# Patient Record
Sex: MALE | Race: WHITE
[De-identification: names, ages, dates, MRNs, and addresses within clinical notes are randomized per-mention and may not be internally consistent; named-entity substitution may affect disease eponyms.]

---

## 2017-01-09 ENCOUNTER — APPOINTMENT (OUTPATIENT)
Dept: VASCULAR SURGERY | Facility: CLINIC | Age: 72
End: 2017-01-09

## 2017-01-09 VITALS
SYSTOLIC BLOOD PRESSURE: 136 MMHG | DIASTOLIC BLOOD PRESSURE: 82 MMHG | RESPIRATION RATE: 16 BRPM | TEMPERATURE: 98.2 F | HEART RATE: 75 BPM

## 2017-01-13 ENCOUNTER — EMERGENCY (EMERGENCY)
Facility: HOSPITAL | Age: 72
LOS: 0 days | Discharge: HOME | End: 2017-01-13
Attending: EMERGENCY MEDICINE
Payer: MEDICARE

## 2017-01-13 VITALS
TEMPERATURE: 98 F | HEIGHT: 69 IN | SYSTOLIC BLOOD PRESSURE: 133 MMHG | OXYGEN SATURATION: 97 % | HEART RATE: 79 BPM | RESPIRATION RATE: 16 BRPM | DIASTOLIC BLOOD PRESSURE: 78 MMHG | WEIGHT: 201.94 LBS

## 2017-01-13 DIAGNOSIS — Z85.118 PERSONAL HISTORY OF OTHER MALIGNANT NEOPLASM OF BRONCHUS AND LUNG: ICD-10-CM

## 2017-01-13 DIAGNOSIS — Z87.891 PERSONAL HISTORY OF NICOTINE DEPENDENCE: ICD-10-CM

## 2017-01-13 DIAGNOSIS — R10.9 UNSPECIFIED ABDOMINAL PAIN: ICD-10-CM

## 2017-01-13 DIAGNOSIS — M19.90 UNSPECIFIED OSTEOARTHRITIS, UNSPECIFIED SITE: ICD-10-CM

## 2017-01-13 DIAGNOSIS — R31.9 HEMATURIA, UNSPECIFIED: ICD-10-CM

## 2017-01-13 LAB
ALBUMIN SERPL ELPH-MCNC: 3.7 G/DL — SIGNIFICANT CHANGE UP (ref 3.3–5)
ALP SERPL-CCNC: 126 U/L — HIGH (ref 40–120)
ALT FLD-CCNC: 42 U/L — SIGNIFICANT CHANGE UP (ref 12–78)
AMYLASE P1 CFR SERPL: 60 U/L — SIGNIFICANT CHANGE UP (ref 25–115)
ANION GAP SERPL CALC-SCNC: 8 MMOL/L — SIGNIFICANT CHANGE UP (ref 5–17)
APAP SERPL-MCNC: < 2 UG/ML (ref 10–30)
APPEARANCE UR: CLEAR — SIGNIFICANT CHANGE UP
APTT BLD: 37.8 SEC — HIGH (ref 27.5–37.4)
AST SERPL-CCNC: 27 U/L — SIGNIFICANT CHANGE UP (ref 15–37)
BACTERIA # UR AUTO: ABNORMAL
BASOPHILS # BLD AUTO: 0.1 K/UL — SIGNIFICANT CHANGE UP (ref 0–0.2)
BASOPHILS NFR BLD AUTO: 0.9 % — SIGNIFICANT CHANGE UP (ref 0–2)
BILIRUB SERPL-MCNC: 0.7 MG/DL — SIGNIFICANT CHANGE UP (ref 0.2–1.2)
BILIRUB UR-MCNC: NEGATIVE — SIGNIFICANT CHANGE UP
BLD GP AB SCN SERPL QL: SIGNIFICANT CHANGE UP
BUN SERPL-MCNC: 22 MG/DL — SIGNIFICANT CHANGE UP (ref 7–23)
CALCIUM SERPL-MCNC: 9.4 MG/DL — SIGNIFICANT CHANGE UP (ref 8.5–10.1)
CHLORIDE SERPL-SCNC: 107 MMOL/L — SIGNIFICANT CHANGE UP (ref 96–108)
CO2 SERPL-SCNC: 27 MMOL/L — SIGNIFICANT CHANGE UP (ref 22–31)
COLOR SPEC: YELLOW — SIGNIFICANT CHANGE UP
CREAT SERPL-MCNC: 0.73 MG/DL — SIGNIFICANT CHANGE UP (ref 0.5–1.3)
DIFF PNL FLD: ABNORMAL
EOSINOPHIL # BLD AUTO: 0.4 K/UL — SIGNIFICANT CHANGE UP (ref 0–0.5)
EOSINOPHIL NFR BLD AUTO: 5.6 % — SIGNIFICANT CHANGE UP (ref 0–6)
EPI CELLS # UR: SIGNIFICANT CHANGE UP
GLUCOSE SERPL-MCNC: 102 MG/DL — HIGH (ref 70–99)
GLUCOSE UR QL: NEGATIVE MG/DL — SIGNIFICANT CHANGE UP
HCT VFR BLD CALC: 40 % — SIGNIFICANT CHANGE UP (ref 39–50)
HGB BLD-MCNC: 14.5 G/DL — SIGNIFICANT CHANGE UP (ref 13–17)
INR BLD: 1.08 RATIO — SIGNIFICANT CHANGE UP (ref 0.88–1.16)
KETONES UR-MCNC: ABNORMAL
LEUKOCYTE ESTERASE UR-ACNC: NEGATIVE — SIGNIFICANT CHANGE UP
LIDOCAIN IGE QN: 188 U/L — SIGNIFICANT CHANGE UP (ref 73–393)
LYMPHOCYTES # BLD AUTO: 1.9 K/UL — SIGNIFICANT CHANGE UP (ref 1–3.3)
LYMPHOCYTES # BLD AUTO: 24.6 % — SIGNIFICANT CHANGE UP (ref 13–44)
MCHC RBC-ENTMCNC: 29.4 PG — SIGNIFICANT CHANGE UP (ref 27–34)
MCHC RBC-ENTMCNC: 36.2 GM/DL — HIGH (ref 32–36)
MCV RBC AUTO: 81.2 FL — SIGNIFICANT CHANGE UP (ref 80–100)
MONOCYTES # BLD AUTO: 0.8 K/UL — SIGNIFICANT CHANGE UP (ref 0–0.9)
MONOCYTES NFR BLD AUTO: 10.9 % — SIGNIFICANT CHANGE UP (ref 2–14)
NEUTROPHILS # BLD AUTO: 4.5 K/UL — SIGNIFICANT CHANGE UP (ref 1.8–7.4)
NEUTROPHILS NFR BLD AUTO: 58 % — SIGNIFICANT CHANGE UP (ref 43–77)
NITRITE UR-MCNC: NEGATIVE — SIGNIFICANT CHANGE UP
OB PNL STL: NEGATIVE — SIGNIFICANT CHANGE UP
PH UR: 5 — SIGNIFICANT CHANGE UP (ref 4.8–8)
PLATELET # BLD AUTO: 236 K/UL — SIGNIFICANT CHANGE UP (ref 150–400)
POTASSIUM SERPL-MCNC: 4 MMOL/L — SIGNIFICANT CHANGE UP (ref 3.5–5.3)
POTASSIUM SERPL-SCNC: 4 MMOL/L — SIGNIFICANT CHANGE UP (ref 3.5–5.3)
PROT SERPL-MCNC: 7.1 GM/DL — SIGNIFICANT CHANGE UP (ref 6–8.3)
PROT UR-MCNC: 15 MG/DL
PROTHROM AB SERPL-ACNC: 12.1 SEC — SIGNIFICANT CHANGE UP (ref 10–13.1)
RBC # BLD: 4.93 M/UL — SIGNIFICANT CHANGE UP (ref 4.2–5.8)
RBC # FLD: 13.2 % — SIGNIFICANT CHANGE UP (ref 11–15)
RBC CASTS # UR COMP ASSIST: ABNORMAL /HPF (ref 0–4)
SALICYLATES SERPL-MCNC: <1.7 MG/DL — LOW (ref 2.8–20)
SODIUM SERPL-SCNC: 142 MMOL/L — SIGNIFICANT CHANGE UP (ref 135–145)
SP GR SPEC: 1 — LOW (ref 1.01–1.02)
UROBILINOGEN FLD QL: NEGATIVE MG/DL — SIGNIFICANT CHANGE UP
WBC # BLD: 7.7 K/UL — SIGNIFICANT CHANGE UP (ref 3.8–10.5)
WBC # FLD AUTO: 7.7 K/UL — SIGNIFICANT CHANGE UP (ref 3.8–10.5)
WBC UR QL: SIGNIFICANT CHANGE UP

## 2017-01-13 PROCEDURE — 99284 EMERGENCY DEPT VISIT MOD MDM: CPT

## 2017-01-13 PROCEDURE — 71010: CPT | Mod: 26

## 2017-01-13 PROCEDURE — 74177 CT ABD & PELVIS W/CONTRAST: CPT | Mod: 26

## 2017-01-13 RX ORDER — MORPHINE SULFATE 50 MG/1
2 CAPSULE, EXTENDED RELEASE ORAL ONCE
Qty: 0 | Refills: 0 | Status: DISCONTINUED | OUTPATIENT
Start: 2017-01-13 | End: 2017-01-13

## 2017-01-13 RX ORDER — PANTOPRAZOLE SODIUM 20 MG/1
40 TABLET, DELAYED RELEASE ORAL ONCE
Qty: 0 | Refills: 0 | Status: COMPLETED | OUTPATIENT
Start: 2017-01-13 | End: 2017-01-13

## 2017-01-13 RX ORDER — IOHEXOL 300 MG/ML
30 INJECTION, SOLUTION INTRAVENOUS ONCE
Qty: 0 | Refills: 0 | Status: COMPLETED | OUTPATIENT
Start: 2017-01-13 | End: 2017-01-13

## 2017-01-13 RX ORDER — ONDANSETRON 8 MG/1
8 TABLET, FILM COATED ORAL ONCE
Qty: 0 | Refills: 0 | Status: COMPLETED | OUTPATIENT
Start: 2017-01-13 | End: 2017-01-13

## 2017-01-13 RX ADMIN — IOHEXOL 30 MILLILITER(S): 300 INJECTION, SOLUTION INTRAVENOUS at 15:43

## 2017-01-13 RX ADMIN — ONDANSETRON 8 MILLIGRAM(S): 8 TABLET, FILM COATED ORAL at 15:31

## 2017-01-13 RX ADMIN — PANTOPRAZOLE SODIUM 40 MILLIGRAM(S): 20 TABLET, DELAYED RELEASE ORAL at 15:30

## 2017-01-13 NOTE — ED PROVIDER NOTE - OBJECTIVE STATEMENT
71 years old male walked in c/o left lower abd pain ( sore) 2/10 constant since yesterday. Pt sts he has been taking tramadol for joints pain for 3 months. Pt also c/o dark urine. Pt denies headache, dizziness, cough, sob, chest pain, nausea, vomiting, fever, chills, abd pain, dysuria or irregular bowel movements.

## 2017-01-13 NOTE — ED ADULT NURSE NOTE - OBJECTIVE STATEMENT
Patient states he has had dark urine since early yesterday morning, the pain in his left lower abdomen started last night. Patient states he has been on tramadol with acetaminophen, and he thinks these symptoms are a side effect of this medication.

## 2017-01-13 NOTE — ED PROVIDER NOTE - PROGRESS NOTE DETAILS
Pt is alert and oriented x 3 smiling the urine sample is clear light yellow pt denies headache, dizziness, sob, chest pain, nausea, vomiting, fever, chills, abd pain, dysuria or irregular bowel movements. Pt 's PMD Dr. Tia Nunez is notified about pt's labs and ct of abd/pelvis and sts he will see pt at this office tomorrow 1/14/2017 at his office. Pt is given and explained all test reports and advised to see his pmd Dr. Tia Nunez tomorrow at his office.

## 2017-01-13 NOTE — ED PROVIDER NOTE - CONSTITUTIONAL, MLM
normal... Well appearing, well nourished, awake, alert, oriented to person, place, time/situation and in no apparent distress. Speaking in clear full sentences smiling appears very comfortable

## 2017-01-29 ENCOUNTER — RESULT REVIEW (OUTPATIENT)
Age: 72
End: 2017-01-29

## 2017-02-05 PROBLEM — M19.90 UNSPECIFIED OSTEOARTHRITIS, UNSPECIFIED SITE: Chronic | Status: ACTIVE | Noted: 2017-01-13

## 2017-02-05 PROBLEM — C34.90 MALIGNANT NEOPLASM OF UNSPECIFIED PART OF UNSPECIFIED BRONCHUS OR LUNG: Chronic | Status: ACTIVE | Noted: 2017-01-13

## 2017-02-07 ENCOUNTER — OUTPATIENT (OUTPATIENT)
Dept: OUTPATIENT SERVICES | Facility: HOSPITAL | Age: 72
LOS: 1 days | Discharge: HOME | End: 2017-02-07

## 2017-02-07 VITALS
HEIGHT: 69 IN | RESPIRATION RATE: 18 BRPM | OXYGEN SATURATION: 98 % | DIASTOLIC BLOOD PRESSURE: 76 MMHG | SYSTOLIC BLOOD PRESSURE: 124 MMHG | HEART RATE: 79 BPM | TEMPERATURE: 97 F | WEIGHT: 205.91 LBS

## 2017-02-07 DIAGNOSIS — I73.9 PERIPHERAL VASCULAR DISEASE, UNSPECIFIED: Chronic | ICD-10-CM

## 2017-02-07 DIAGNOSIS — Z98.890 OTHER SPECIFIED POSTPROCEDURAL STATES: Chronic | ICD-10-CM

## 2017-02-07 DIAGNOSIS — Z01.818 ENCOUNTER FOR OTHER PREPROCEDURAL EXAMINATION: ICD-10-CM

## 2017-02-07 DIAGNOSIS — I73.9 PERIPHERAL VASCULAR DISEASE, UNSPECIFIED: ICD-10-CM

## 2017-02-07 DIAGNOSIS — D49.4 NEOPLASM OF UNSPECIFIED BEHAVIOR OF BLADDER: ICD-10-CM

## 2017-02-07 DIAGNOSIS — N32.9 BLADDER DISORDER, UNSPECIFIED: Chronic | ICD-10-CM

## 2017-02-07 DIAGNOSIS — Z90.2 ACQUIRED ABSENCE OF LUNG [PART OF]: Chronic | ICD-10-CM

## 2017-02-07 LAB
ANION GAP SERPL CALC-SCNC: 7 MMOL/L — SIGNIFICANT CHANGE UP (ref 5–17)
APPEARANCE UR: CLEAR — SIGNIFICANT CHANGE UP
APTT BLD: 36.3 SEC — SIGNIFICANT CHANGE UP (ref 27.5–37.4)
BASOPHILS # BLD AUTO: 0.1 K/UL — SIGNIFICANT CHANGE UP (ref 0–0.2)
BASOPHILS NFR BLD AUTO: 1 % — SIGNIFICANT CHANGE UP (ref 0–2)
BILIRUB UR-MCNC: NEGATIVE — SIGNIFICANT CHANGE UP
BUN SERPL-MCNC: 14 MG/DL — SIGNIFICANT CHANGE UP (ref 7–23)
CALCIUM SERPL-MCNC: 8.7 MG/DL — SIGNIFICANT CHANGE UP (ref 8.5–10.1)
CHLORIDE SERPL-SCNC: 107 MMOL/L — SIGNIFICANT CHANGE UP (ref 96–108)
CO2 SERPL-SCNC: 28 MMOL/L — SIGNIFICANT CHANGE UP (ref 22–31)
COLOR SPEC: YELLOW — SIGNIFICANT CHANGE UP
CREAT SERPL-MCNC: 0.68 MG/DL — SIGNIFICANT CHANGE UP (ref 0.5–1.3)
DIFF PNL FLD: NEGATIVE — SIGNIFICANT CHANGE UP
EOSINOPHIL # BLD AUTO: 0.6 K/UL — HIGH (ref 0–0.5)
EOSINOPHIL NFR BLD AUTO: 7.6 % — HIGH (ref 0–6)
GLUCOSE SERPL-MCNC: 85 MG/DL — SIGNIFICANT CHANGE UP (ref 70–99)
GLUCOSE UR QL: NEGATIVE MG/DL — SIGNIFICANT CHANGE UP
HCT VFR BLD CALC: 38.9 % — LOW (ref 39–50)
HGB BLD-MCNC: 13.3 G/DL — SIGNIFICANT CHANGE UP (ref 13–17)
INR BLD: 1.07 RATIO — SIGNIFICANT CHANGE UP (ref 0.88–1.16)
KETONES UR-MCNC: NEGATIVE — SIGNIFICANT CHANGE UP
LEUKOCYTE ESTERASE UR-ACNC: NEGATIVE — SIGNIFICANT CHANGE UP
LYMPHOCYTES # BLD AUTO: 1.6 K/UL — SIGNIFICANT CHANGE UP (ref 1–3.3)
LYMPHOCYTES # BLD AUTO: 22.3 % — SIGNIFICANT CHANGE UP (ref 13–44)
MCHC RBC-ENTMCNC: 27.7 PG — SIGNIFICANT CHANGE UP (ref 27–34)
MCHC RBC-ENTMCNC: 34.2 GM/DL — SIGNIFICANT CHANGE UP (ref 32–36)
MCV RBC AUTO: 81.1 FL — SIGNIFICANT CHANGE UP (ref 80–100)
MONOCYTES # BLD AUTO: 0.7 K/UL — SIGNIFICANT CHANGE UP (ref 0–0.9)
MONOCYTES NFR BLD AUTO: 10.1 % — SIGNIFICANT CHANGE UP (ref 2–14)
NEUTROPHILS # BLD AUTO: 4.3 K/UL — SIGNIFICANT CHANGE UP (ref 1.8–7.4)
NEUTROPHILS NFR BLD AUTO: 59 % — SIGNIFICANT CHANGE UP (ref 43–77)
NITRITE UR-MCNC: NEGATIVE — SIGNIFICANT CHANGE UP
PH UR: 6 — SIGNIFICANT CHANGE UP (ref 4.8–8)
PLATELET # BLD AUTO: 225 K/UL — SIGNIFICANT CHANGE UP (ref 150–400)
POTASSIUM SERPL-MCNC: 4.1 MMOL/L — SIGNIFICANT CHANGE UP (ref 3.5–5.3)
POTASSIUM SERPL-SCNC: 4.1 MMOL/L — SIGNIFICANT CHANGE UP (ref 3.5–5.3)
PROT UR-MCNC: NEGATIVE MG/DL — SIGNIFICANT CHANGE UP
PROTHROM AB SERPL-ACNC: 12 SEC — SIGNIFICANT CHANGE UP (ref 10–13.1)
RBC # BLD: 4.8 M/UL — SIGNIFICANT CHANGE UP (ref 4.2–5.8)
RBC # FLD: 12.4 % — SIGNIFICANT CHANGE UP (ref 11–15)
SODIUM SERPL-SCNC: 142 MMOL/L — SIGNIFICANT CHANGE UP (ref 135–145)
SP GR SPEC: 1.01 — SIGNIFICANT CHANGE UP (ref 1.01–1.02)
UROBILINOGEN FLD QL: 1 MG/DL
WBC # BLD: 7.3 K/UL — SIGNIFICANT CHANGE UP (ref 3.8–10.5)
WBC # FLD AUTO: 7.3 K/UL — SIGNIFICANT CHANGE UP (ref 3.8–10.5)

## 2017-02-07 NOTE — H&P PST ADULT - VISION (WITH CORRECTIVE LENSES IF THE PATIENT USUALLY WEARS THEM):
Partially impaired: cannot see medication labels or newsprint, but can see obstacles in path, and the surrounding layout; can count fingers at arm's length/Use glasses

## 2017-02-07 NOTE — H&P PST ADULT - NSANTHOSAYNRD_GEN_A_CORE
No. JOSE LUIS screening performed.  STOP BANG Legend: 0-2 = LOW Risk; 3-4 = INTERMEDIATE Risk; 5-8 = HIGH Risk

## 2017-02-07 NOTE — H&P PST ADULT - PMH
Arthritis    Lung cancer  NODULE REMOVED RIGHT SIDE 2016 Arthritis    HLD (hyperlipidemia)    Lung cancer  NODULE REMOVED RIGHT SIDE 2016  PVD (peripheral vascular disease)

## 2017-02-07 NOTE — H&P PST ADULT - PSH
Bladder problem  Had Surgery  PVD (peripheral vascular disease)  stents bilateral legs  S/P arthroscopy of right knee  x 2  S/P foot surgery, left    S/P inguinal hernia repair  Left  S/P lobectomy of lung  Right ( February 2016 )  S/P lumbar spine operation  2010

## 2017-02-07 NOTE — ASU PATIENT PROFILE, ADULT - PSH
Bladder problem  Had Surgery  S/P arthroscopy of right knee  x 2  S/P foot surgery, left    S/P inguinal hernia repair  Left  S/P lobectomy of lung  Right ( February 2016 )  S/P lumbar spine operation  2010

## 2017-02-07 NOTE — H&P PST ADULT - HISTORY OF PRESENT ILLNESS
71 year old male with neoplasm of unspecified behavior of bladder scheduled for transurethral resection of bladder

## 2017-02-08 LAB
CULTURE RESULTS: NO GROWTH — SIGNIFICANT CHANGE UP
SPECIMEN SOURCE: SIGNIFICANT CHANGE UP

## 2017-02-12 ENCOUNTER — RESULT REVIEW (OUTPATIENT)
Age: 72
End: 2017-02-12

## 2017-02-13 ENCOUNTER — OUTPATIENT (OUTPATIENT)
Dept: OUTPATIENT SERVICES | Facility: HOSPITAL | Age: 72
LOS: 1 days | Discharge: HOME | End: 2017-02-13
Payer: MEDICARE

## 2017-02-13 ENCOUNTER — TRANSCRIPTION ENCOUNTER (OUTPATIENT)
Age: 72
End: 2017-02-13

## 2017-02-13 VITALS
SYSTOLIC BLOOD PRESSURE: 116 MMHG | HEART RATE: 78 BPM | RESPIRATION RATE: 18 BRPM | OXYGEN SATURATION: 98 % | DIASTOLIC BLOOD PRESSURE: 80 MMHG

## 2017-02-13 VITALS
OXYGEN SATURATION: 100 % | WEIGHT: 205.91 LBS | HEIGHT: 69 IN | TEMPERATURE: 97 F | HEART RATE: 83 BPM | RESPIRATION RATE: 18 BRPM | DIASTOLIC BLOOD PRESSURE: 71 MMHG | SYSTOLIC BLOOD PRESSURE: 114 MMHG

## 2017-02-13 DIAGNOSIS — N32.9 BLADDER DISORDER, UNSPECIFIED: Chronic | ICD-10-CM

## 2017-02-13 DIAGNOSIS — Z98.890 OTHER SPECIFIED POSTPROCEDURAL STATES: Chronic | ICD-10-CM

## 2017-02-13 DIAGNOSIS — I73.9 PERIPHERAL VASCULAR DISEASE, UNSPECIFIED: Chronic | ICD-10-CM

## 2017-02-13 DIAGNOSIS — Z90.2 ACQUIRED ABSENCE OF LUNG [PART OF]: Chronic | ICD-10-CM

## 2017-02-13 PROCEDURE — 88112 CYTOPATH CELL ENHANCE TECH: CPT | Mod: 26

## 2017-02-13 PROCEDURE — 88307 TISSUE EXAM BY PATHOLOGIST: CPT | Mod: 26

## 2017-02-13 RX ORDER — FUROSEMIDE 40 MG
10 TABLET ORAL ONCE
Qty: 0 | Refills: 0 | Status: COMPLETED | OUTPATIENT
Start: 2017-02-13 | End: 2017-02-13

## 2017-02-13 RX ORDER — MORPHINE SULFATE 50 MG/1
2 CAPSULE, EXTENDED RELEASE ORAL
Qty: 0 | Refills: 0 | Status: DISCONTINUED | OUTPATIENT
Start: 2017-02-13 | End: 2017-02-13

## 2017-02-13 RX ORDER — SODIUM CHLORIDE 9 MG/ML
3 INJECTION INTRAMUSCULAR; INTRAVENOUS; SUBCUTANEOUS EVERY 8 HOURS
Qty: 0 | Refills: 0 | Status: DISCONTINUED | OUTPATIENT
Start: 2017-02-13 | End: 2017-02-13

## 2017-02-13 RX ORDER — SODIUM CHLORIDE 9 MG/ML
1000 INJECTION, SOLUTION INTRAVENOUS
Qty: 0 | Refills: 0 | Status: DISCONTINUED | OUTPATIENT
Start: 2017-02-13 | End: 2017-02-13

## 2017-02-13 RX ADMIN — Medication 10 MILLIGRAM(S): at 14:06

## 2017-02-13 RX ADMIN — SODIUM CHLORIDE 100 MILLILITER(S): 9 INJECTION, SOLUTION INTRAVENOUS at 14:28

## 2017-02-13 NOTE — BRIEF OPERATIVE NOTE - PRE-OP DX
Bladder tumor  02/13/2017    Active  [facility]  Ureteral tumor  02/13/2017  right distal  Active  [facility]

## 2017-02-13 NOTE — ASU PATIENT PROFILE, ADULT - PMH
Arthritis    HLD (hyperlipidemia)    Lung cancer  NODULE REMOVED RIGHT SIDE 2016  PVD (peripheral vascular disease)

## 2017-02-13 NOTE — BRIEF OPERATIVE NOTE - POST-OP DX
Bladder tumor  02/13/2017  multifocal  Active  [facility]  Ureter ca, right  02/13/2017    Active  [facility]

## 2017-02-13 NOTE — BRIEF OPERATIVE NOTE - PROCEDURE
Ureteroscopy, rigid  02/13/2017  right  Active  [facility]  TURB (transurethral resection of bladder)  02/13/2017  large tumor  Active  [facility]

## 2017-02-14 LAB — NON-GYNECOLOGICAL CYTOLOGY STUDY: SIGNIFICANT CHANGE UP

## 2017-02-15 LAB — SURGICAL PATHOLOGY FINAL REPORT - CH: SIGNIFICANT CHANGE UP

## 2017-02-16 DIAGNOSIS — I10 ESSENTIAL (PRIMARY) HYPERTENSION: ICD-10-CM

## 2017-02-16 DIAGNOSIS — M19.90 UNSPECIFIED OSTEOARTHRITIS, UNSPECIFIED SITE: ICD-10-CM

## 2017-02-16 DIAGNOSIS — C67.2 MALIGNANT NEOPLASM OF LATERAL WALL OF BLADDER: ICD-10-CM

## 2017-02-16 DIAGNOSIS — Z87.891 PERSONAL HISTORY OF NICOTINE DEPENDENCE: ICD-10-CM

## 2017-02-16 DIAGNOSIS — E78.5 HYPERLIPIDEMIA, UNSPECIFIED: ICD-10-CM

## 2017-02-16 DIAGNOSIS — Z86.73 PERSONAL HISTORY OF TRANSIENT ISCHEMIC ATTACK (TIA), AND CEREBRAL INFARCTION WITHOUT RESIDUAL DEFICITS: ICD-10-CM

## 2017-02-16 DIAGNOSIS — Z79.82 LONG TERM (CURRENT) USE OF ASPIRIN: ICD-10-CM

## 2017-02-16 DIAGNOSIS — Z79.02 LONG TERM (CURRENT) USE OF ANTITHROMBOTICS/ANTIPLATELETS: ICD-10-CM

## 2017-02-16 DIAGNOSIS — Z85.118 PERSONAL HISTORY OF OTHER MALIGNANT NEOPLASM OF BRONCHUS AND LUNG: ICD-10-CM

## 2017-03-05 ENCOUNTER — RESULT REVIEW (OUTPATIENT)
Age: 72
End: 2017-03-05

## 2017-03-06 ENCOUNTER — OUTPATIENT (OUTPATIENT)
Dept: OUTPATIENT SERVICES | Facility: HOSPITAL | Age: 72
LOS: 1 days | Discharge: HOME | End: 2017-03-06
Payer: MEDICARE

## 2017-03-06 ENCOUNTER — TRANSCRIPTION ENCOUNTER (OUTPATIENT)
Age: 72
End: 2017-03-06

## 2017-03-06 VITALS
OXYGEN SATURATION: 99 % | RESPIRATION RATE: 16 BRPM | SYSTOLIC BLOOD PRESSURE: 111 MMHG | DIASTOLIC BLOOD PRESSURE: 57 MMHG | WEIGHT: 201.06 LBS | HEART RATE: 74 BPM | HEIGHT: 69 IN | TEMPERATURE: 96 F

## 2017-03-06 VITALS
SYSTOLIC BLOOD PRESSURE: 120 MMHG | HEART RATE: 66 BPM | RESPIRATION RATE: 20 BRPM | OXYGEN SATURATION: 100 % | DIASTOLIC BLOOD PRESSURE: 66 MMHG

## 2017-03-06 DIAGNOSIS — Z98.890 OTHER SPECIFIED POSTPROCEDURAL STATES: Chronic | ICD-10-CM

## 2017-03-06 DIAGNOSIS — I73.9 PERIPHERAL VASCULAR DISEASE, UNSPECIFIED: Chronic | ICD-10-CM

## 2017-03-06 DIAGNOSIS — N32.9 BLADDER DISORDER, UNSPECIFIED: Chronic | ICD-10-CM

## 2017-03-06 DIAGNOSIS — Z90.2 ACQUIRED ABSENCE OF LUNG [PART OF]: Chronic | ICD-10-CM

## 2017-03-06 PROCEDURE — 88307 TISSUE EXAM BY PATHOLOGIST: CPT | Mod: 26

## 2017-03-06 RX ORDER — ASPIRIN/CALCIUM CARB/MAGNESIUM 324 MG
0 TABLET ORAL
Qty: 0 | Refills: 0 | COMMUNITY

## 2017-03-06 RX ORDER — SODIUM CHLORIDE 9 MG/ML
1000 INJECTION, SOLUTION INTRAVENOUS
Qty: 0 | Refills: 0 | Status: DISCONTINUED | OUTPATIENT
Start: 2017-03-06 | End: 2017-03-06

## 2017-03-06 RX ORDER — FENTANYL CITRATE 50 UG/ML
25 INJECTION INTRAVENOUS
Qty: 0 | Refills: 0 | Status: DISCONTINUED | OUTPATIENT
Start: 2017-03-06 | End: 2017-03-06

## 2017-03-06 RX ORDER — ONDANSETRON 8 MG/1
4 TABLET, FILM COATED ORAL ONCE
Qty: 0 | Refills: 0 | Status: DISCONTINUED | OUTPATIENT
Start: 2017-03-06 | End: 2017-03-06

## 2017-03-06 RX ORDER — ATORVASTATIN CALCIUM 80 MG/1
1 TABLET, FILM COATED ORAL
Qty: 0 | Refills: 0 | COMMUNITY

## 2017-03-06 RX ORDER — CLOPIDOGREL BISULFATE 75 MG/1
1 TABLET, FILM COATED ORAL
Qty: 0 | Refills: 0 | COMMUNITY

## 2017-03-06 RX ADMIN — SODIUM CHLORIDE 75 MILLILITER(S): 9 INJECTION, SOLUTION INTRAVENOUS at 14:00

## 2017-03-06 NOTE — H&P PST ADULT - HISTORY OF PRESENT ILLNESS
71 year old male with Malignant neoplasm  of bladder scheduled for transurethral resection of bladder. Had TURB on Feb 13, 2017. Readmitted for repeat TURB for staging

## 2017-03-08 LAB — SURGICAL PATHOLOGY FINAL REPORT - CH: SIGNIFICANT CHANGE UP

## 2017-03-10 DIAGNOSIS — Z79.82 LONG TERM (CURRENT) USE OF ASPIRIN: ICD-10-CM

## 2017-03-10 DIAGNOSIS — Z85.118 PERSONAL HISTORY OF OTHER MALIGNANT NEOPLASM OF BRONCHUS AND LUNG: ICD-10-CM

## 2017-03-10 DIAGNOSIS — N32.89 OTHER SPECIFIED DISORDERS OF BLADDER: ICD-10-CM

## 2017-03-10 DIAGNOSIS — E78.5 HYPERLIPIDEMIA, UNSPECIFIED: ICD-10-CM

## 2017-03-10 DIAGNOSIS — Z87.891 PERSONAL HISTORY OF NICOTINE DEPENDENCE: ICD-10-CM

## 2017-03-10 DIAGNOSIS — M19.90 UNSPECIFIED OSTEOARTHRITIS, UNSPECIFIED SITE: ICD-10-CM

## 2017-03-10 DIAGNOSIS — Z79.02 LONG TERM (CURRENT) USE OF ANTITHROMBOTICS/ANTIPLATELETS: ICD-10-CM

## 2017-03-10 DIAGNOSIS — N35.9 URETHRAL STRICTURE, UNSPECIFIED: ICD-10-CM

## 2017-04-03 ENCOUNTER — APPOINTMENT (OUTPATIENT)
Dept: VASCULAR SURGERY | Facility: CLINIC | Age: 72
End: 2017-04-03

## 2017-04-03 VITALS
HEART RATE: 76 BPM | BODY MASS INDEX: 29.33 KG/M2 | DIASTOLIC BLOOD PRESSURE: 90 MMHG | WEIGHT: 198 LBS | SYSTOLIC BLOOD PRESSURE: 129 MMHG | TEMPERATURE: 98 F | HEIGHT: 69 IN

## 2017-07-10 ENCOUNTER — APPOINTMENT (OUTPATIENT)
Dept: VASCULAR SURGERY | Facility: CLINIC | Age: 72
End: 2017-07-10

## 2017-07-17 ENCOUNTER — APPOINTMENT (OUTPATIENT)
Dept: VASCULAR SURGERY | Facility: CLINIC | Age: 72
End: 2017-07-17

## 2017-07-31 ENCOUNTER — APPOINTMENT (OUTPATIENT)
Dept: VASCULAR SURGERY | Facility: CLINIC | Age: 72
End: 2017-07-31

## 2017-08-14 ENCOUNTER — APPOINTMENT (OUTPATIENT)
Dept: VASCULAR SURGERY | Facility: CLINIC | Age: 72
End: 2017-08-14
Payer: MEDICARE

## 2017-08-14 VITALS
DIASTOLIC BLOOD PRESSURE: 79 MMHG | HEART RATE: 80 BPM | SYSTOLIC BLOOD PRESSURE: 145 MMHG | RESPIRATION RATE: 16 BRPM | HEIGHT: 69 IN | TEMPERATURE: 98.6 F

## 2017-08-14 PROCEDURE — 99213 OFFICE O/P EST LOW 20 MIN: CPT | Mod: 25

## 2017-08-14 PROCEDURE — 93926 LOWER EXTREMITY STUDY: CPT

## 2017-11-13 ENCOUNTER — APPOINTMENT (OUTPATIENT)
Dept: VASCULAR SURGERY | Facility: CLINIC | Age: 72
End: 2017-11-13
Payer: MEDICARE

## 2017-11-13 VITALS — HEART RATE: 80 BPM | DIASTOLIC BLOOD PRESSURE: 68 MMHG | SYSTOLIC BLOOD PRESSURE: 132 MMHG

## 2017-11-13 PROCEDURE — 99214 OFFICE O/P EST MOD 30 MIN: CPT

## 2017-11-13 PROCEDURE — 93925 LOWER EXTREMITY STUDY: CPT

## 2018-03-19 ENCOUNTER — APPOINTMENT (OUTPATIENT)
Dept: VASCULAR SURGERY | Facility: CLINIC | Age: 73
End: 2018-03-19
Payer: MEDICARE

## 2018-03-19 VITALS — SYSTOLIC BLOOD PRESSURE: 145 MMHG | HEART RATE: 72 BPM | DIASTOLIC BLOOD PRESSURE: 81 MMHG

## 2018-03-19 PROCEDURE — 99214 OFFICE O/P EST MOD 30 MIN: CPT

## 2018-03-19 PROCEDURE — 93925 LOWER EXTREMITY STUDY: CPT

## 2018-03-19 RX ORDER — TRAMADOL HYDROCHLORIDE AND ACETAMINOPHEN 37.5; 325 MG/1; MG/1
37.5-325 TABLET, FILM COATED ORAL
Refills: 0 | Status: ACTIVE | COMMUNITY

## 2018-06-18 ENCOUNTER — APPOINTMENT (OUTPATIENT)
Dept: VASCULAR SURGERY | Facility: CLINIC | Age: 73
End: 2018-06-18
Payer: MEDICARE

## 2018-06-18 PROCEDURE — 99214 OFFICE O/P EST MOD 30 MIN: CPT

## 2018-06-18 PROCEDURE — 93925 LOWER EXTREMITY STUDY: CPT

## 2018-09-17 ENCOUNTER — APPOINTMENT (OUTPATIENT)
Dept: VASCULAR SURGERY | Facility: CLINIC | Age: 73
End: 2018-09-17
Payer: MEDICARE

## 2018-09-17 PROCEDURE — 99213 OFFICE O/P EST LOW 20 MIN: CPT

## 2018-09-17 PROCEDURE — 93925 LOWER EXTREMITY STUDY: CPT

## 2018-09-18 PROBLEM — E78.5 HYPERLIPIDEMIA, UNSPECIFIED: Chronic | Status: ACTIVE | Noted: 2017-02-07

## 2018-09-18 PROBLEM — I73.9 PERIPHERAL VASCULAR DISEASE, UNSPECIFIED: Chronic | Status: ACTIVE | Noted: 2017-02-07

## 2018-12-14 NOTE — ED PROVIDER NOTE - CPE EDP SKIN NORM
Telephone Encounter by Urmila Ortiz NCMA at 09/26/17 12:00 PM     Author:  Urmila Ortiz NCMA Service:  (none) Author Type:  Certified Medical Assistant     Filed:  09/26/17 12:02 PM Encounter Date:  9/24/2017 Status:  Signed     :  Urmila Ortiz NCMA (Certified Medical Assistant)            Prescription has been electronically faxed to your pharmacy.    Refilled per medication protocol.[TR1.1T]        Revision History        User Key Date/Time User Provider Type Action    > TR1.1 09/26/17 12:02 PM Urmila Ortiz NCMA Certified Medical Assistant Sign    T - Template            
normal...

## 2019-01-07 ENCOUNTER — APPOINTMENT (OUTPATIENT)
Dept: VASCULAR SURGERY | Facility: CLINIC | Age: 74
End: 2019-01-07
Payer: MEDICARE

## 2019-01-07 VITALS
DIASTOLIC BLOOD PRESSURE: 88 MMHG | BODY MASS INDEX: 29.33 KG/M2 | WEIGHT: 198 LBS | HEIGHT: 69 IN | SYSTOLIC BLOOD PRESSURE: 135 MMHG | HEART RATE: 81 BPM

## 2019-01-07 PROCEDURE — 99213 OFFICE O/P EST LOW 20 MIN: CPT

## 2019-01-07 PROCEDURE — 93925 LOWER EXTREMITY STUDY: CPT

## 2019-01-08 NOTE — PHYSICAL EXAM
[No Rash or Lesion] : No rash or lesion [Alert] : alert [JVD] : no jugular venous distention  [Normal Breath Sounds] : Normal breath sounds [Normal Heart Sounds] : normal heart sounds [2+] : left 2+ [Ankle Swelling (On Exam)] : not present [Varicose Veins Of Lower Extremities] : not present [] : not present [Abdomen Masses] : No abdominal masses [Skin Ulcer] : no ulcer [Oriented to Place] : oriented to place [de-identified] : appears well [de-identified] : motor intact b/l lower extremities, muscle strength 5/5 with dorsi and plantar flexion\par  [de-identified] : b/l feet are warm with brisk cap refill [de-identified] : sensation intact b/l lower extremities

## 2019-01-08 NOTE — HISTORY OF PRESENT ILLNESS
[FreeTextEntry1] : 73 yo male with history of pad presents for follow up without any new complaints.  pt with chronic back pain and b/l lower extremity stiffness with chronic arthritis of the right knee.  pt reports pain of the right knee especially during damp cool weather.  pt denies any lower extremity claudications.  pt denies any lower extremity wounds or rest pain.  \par pt currently on asa plavix and statin and doing well

## 2019-01-08 NOTE — ASSESSMENT
[FreeTextEntry1] : 70 yo male with history of pad presents for follow up without any new complaints\par \par -us duplex shows persistant >75% stenosis of the left proximal native sfa with 20-50% in stent stenoisis bilaterally \par -pt to continue asa, plavix and lipitor \par -pt to follow up in 3 months with repeat duplex

## 2019-04-04 NOTE — H&P PST ADULT - PROBLEM SELECTOR PLAN 1
Electrodesiccation And Curettage Text: The wound bed was treated with electrodesiccation and curettage after the biopsy was performed. neoplasm of unspecified behavior of bladder scheduled for transurethral resection of bladder

## 2019-04-08 ENCOUNTER — APPOINTMENT (OUTPATIENT)
Dept: VASCULAR SURGERY | Facility: CLINIC | Age: 74
End: 2019-04-08
Payer: MEDICARE

## 2019-04-08 VITALS
HEART RATE: 75 BPM | BODY MASS INDEX: 29.33 KG/M2 | SYSTOLIC BLOOD PRESSURE: 105 MMHG | WEIGHT: 198 LBS | HEIGHT: 69 IN | DIASTOLIC BLOOD PRESSURE: 71 MMHG

## 2019-04-08 PROCEDURE — 93925 LOWER EXTREMITY STUDY: CPT

## 2019-04-08 PROCEDURE — 99213 OFFICE O/P EST LOW 20 MIN: CPT

## 2019-04-08 NOTE — HISTORY OF PRESENT ILLNESS
[FreeTextEntry1] : 71 yo male with history of pad presents for follow up without any new complaints.  pt with chronic back pain and b/l lower extremity stiffness with chronic arthritis of the right knee.  pt reports pain of the right knee especially during damp cool weather.  pt denies any lower extremity claudications.  pt denies any lower extremity wounds or rest pain.  \par pt currently on asa plavix and statin and doing well

## 2019-04-08 NOTE — ASSESSMENT
[FreeTextEntry1] : 72 yo male with history of pad presents for follow up without any new complaints\par \par -us duplex shows persistant >75% stenosis of the left proximal native sfa with 20-50% in stent stenoisis bilaterally \par -pt to continue asa, plavix and lipitor \par -pt to follow up in 3 months with repeat duplex

## 2019-04-08 NOTE — PHYSICAL EXAM
[JVD] : no jugular venous distention  [Normal Breath Sounds] : Normal breath sounds [Normal Heart Sounds] : normal heart sounds [2+] : left 2+ [Ankle Swelling (On Exam)] : not present [Varicose Veins Of Lower Extremities] : not present [] : not present [Abdomen Masses] : No abdominal masses [No Rash or Lesion] : No rash or lesion [Skin Ulcer] : no ulcer [Alert] : alert [Oriented to Place] : oriented to place [de-identified] : appears well [de-identified] : motor intact b/l lower extremities, muscle strength 5/5 with dorsi and plantar flexion\par  [de-identified] : b/l feet are warm with brisk cap refill [de-identified] : sensation intact b/l lower extremities

## 2019-06-26 ENCOUNTER — RECORD ABSTRACTING (OUTPATIENT)
Age: 74
End: 2019-06-26

## 2019-06-26 DIAGNOSIS — Z87.898 PERSONAL HISTORY OF OTHER SPECIFIED CONDITIONS: ICD-10-CM

## 2019-06-26 DIAGNOSIS — M19.90 UNSPECIFIED OSTEOARTHRITIS, UNSPECIFIED SITE: ICD-10-CM

## 2019-06-26 DIAGNOSIS — Z87.448 PERSONAL HISTORY OF OTHER DISEASES OF URINARY SYSTEM: ICD-10-CM

## 2019-06-26 DIAGNOSIS — N32.81 OVERACTIVE BLADDER: ICD-10-CM

## 2019-06-26 DIAGNOSIS — R33.8 OTHER RETENTION OF URINE: ICD-10-CM

## 2019-07-22 ENCOUNTER — APPOINTMENT (OUTPATIENT)
Dept: VASCULAR SURGERY | Facility: CLINIC | Age: 74
End: 2019-07-22
Payer: MEDICARE

## 2019-07-22 PROCEDURE — 93925 LOWER EXTREMITY STUDY: CPT

## 2019-07-22 PROCEDURE — 99213 OFFICE O/P EST LOW 20 MIN: CPT

## 2019-07-22 NOTE — PHYSICAL EXAM
[JVD] : no jugular venous distention  [Normal Breath Sounds] : Normal breath sounds [Normal Heart Sounds] : normal heart sounds [2+] : left 2+ [Ankle Swelling (On Exam)] : not present [Varicose Veins Of Lower Extremities] : not present [] : not present [Abdomen Masses] : No abdominal masses [No Rash or Lesion] : No rash or lesion [Alert] : alert [Skin Ulcer] : no ulcer [Oriented to Place] : oriented to place [de-identified] : appears well [de-identified] : motor intact b/l lower extremities, muscle strength 5/5 with dorsi and plantar flexion\par  [de-identified] : sensation intact b/l lower extremities [de-identified] : b/l feet are warm with brisk cap refill

## 2019-09-05 ENCOUNTER — APPOINTMENT (OUTPATIENT)
Dept: UROLOGY | Facility: CLINIC | Age: 74
End: 2019-09-05
Payer: MEDICARE

## 2019-09-05 DIAGNOSIS — Z85.118 PERSONAL HISTORY OF OTHER MALIGNANT NEOPLASM OF BRONCHUS AND LUNG: ICD-10-CM

## 2019-09-05 PROCEDURE — 52000 CYSTOURETHROSCOPY: CPT

## 2019-09-05 PROCEDURE — 99214 OFFICE O/P EST MOD 30 MIN: CPT | Mod: 25

## 2019-09-05 NOTE — HISTORY OF PRESENT ILLNESS
[FreeTextEntry1] : h/o ca bladder for follow up cysto , LUTS on Tamsulosin\par \par The patient is a 73 year old male who presents for a Follow-up for Bladder cancer. Date first diagnosed: (2001). TNM Staging - T: T1 ; N: N0 ; M: M0 .  Grade: G2. Residual tumor: unknown. Cell type: transitional cell carcinoma. Treatment components: transurethral resection and mitomycin. Diagnostic tests include cystoscopy and cytology. Note for "Bladder cancer": patient recalls having bladder tumor in 2001, treated with intravesical therapy. 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 Pathology : negative\par 04/05/2017 He was here for evaluation of effect of Ceftin\par 04/12/2017 Urine Culture results: No Growth\par \par patient has irritative symptoms , will r/o infection, urine culture if negative schedule for mitomycin c\par 04/24/2017: urine culture : no growth\par Mitomycin not available, therefore will start BCG\par \par 06/13/2017  1st BCG instillation was given.\par LOT: [id number]\par Exp: Feb 07, 2018\par \par 06/22/2017  2nd BCG live instillation WAS GIVEN.\par LOT: [id number]\par EXP: Feb 07/2018\par \par 06/29/2017 3RD BCG live instillation was given.\par Lot: [id number]\par EXP: FEB 07/2018\par \par 07/06/2017 4th BCG live instillation was given\par Lot: [id number]\par EXP: FEB 07/2018\par \par 07/13/2017 5th BCG live instillation was given.\par Lot:[id number]\par EXP: FEB 07/2018\par \par 07/27/2017 6th BCG live instillation was given.\par LOT:[id number]\par EXP:FEB 07/2018\par \par 09/06/2017 he is here for cystoscopy & cytology\par \par 11/01/2017 He was here for Pathology result.\par Bladder: Bladder wall with benign urothelial lining and minimal chronic inflammation.\par Urine Cytology: Negative.\par \par 11/01/2017 uroflo & bladder sono was done.\par \par 12/11/2017 Cysto was done.\par 01/11/2017 He is here for Pathology result.\par Bladder: Bladder wall with benign urothelial loning and focal small von brunn nest.\par Urine Cytology: Atypical findings.\par \par 03/14/2018 Cysto, cyt and B-sono was done.\par Pathology result:\par Bladder: Bladder mucosa with benign urothelial lining.\par Urine cyology: Acellular specimen\par \par 03/28/2018 Uroflo and B-sono was done.\par \par 05/23/2018 He is here for Cysto and Cyt.\par Pathology result:\par Bladder: Bladder mucosa with benign urothelial lining.\par Urine cystology: Negative.\par \par 06/20/2018 He was here for Urodynmic and B-sono.\par \par 08/08/2018 Uroflo and B-sono was done.\par \par 10/03/2018 cysto and cytology was done.\par \par 10/17/2018 He was here for Pathology result, Uroflow and B-sono.\par Pathology result: Bladder mucosa with beingn urothelial lining and focal von brunn nest.\par Urine Cytology: Atypical fingings.\par \par 01/23/2019  Uroflo and cystoscopy was done.\par \par 02/07/2019 He was here for Pathology result.\par Bladder: Bladder mucosa with mild chronic inflammation\par Urine cytology: Negative.\par \par 05/08/2019 Cystoscopy and Cytology was done.\par \par 06/12/2019  Pathology result\par Bladder: BLADDER MUCOSA WITH BENIGN UROTHELIAL LINING\par Urine Cytology: Atypical findings\par \par for follow up cystoscopy \par

## 2019-09-05 NOTE — PHYSICAL EXAM
[General Appearance - Well Developed] : well developed [General Appearance - Well Nourished] : well nourished [Normal Appearance] : normal appearance [Well Groomed] : well groomed [General Appearance - In No Acute Distress] : no acute distress [Abdomen Soft] : soft [Abdomen Tenderness] : non-tender [Costovertebral Angle Tenderness] : no ~M costovertebral angle tenderness [Urethral Meatus] : meatus normal [Urinary Bladder Findings] : the bladder was normal on palpation [Scrotum] : the scrotum was normal [Testes Mass (___cm)] : there were no testicular masses [Edema] : no peripheral edema [] : no respiratory distress [Respiration, Rhythm And Depth] : normal respiratory rhythm and effort [Exaggerated Use Of Accessory Muscles For Inspiration] : no accessory muscle use [Oriented To Time, Place, And Person] : oriented to person, place, and time [Affect] : the affect was normal [Not Anxious] : not anxious [Mood] : the mood was normal [No Palpable Adenopathy] : no palpable adenopathy [No Focal Deficits] : no focal deficits [FreeTextEntry1] : uses cane

## 2019-09-05 NOTE — REVIEW OF SYSTEMS
[Fever] : fever [Chills] : chills [Feeling Poorly] : feeling poorly [Feeling Tired] : feeling tired [Recent Weight Gain (___ Lbs)] : recent [unfilled] ~Ulb weight gain [Sore Throat] : sore throat [Constipation] : constipation [Heartburn] : heartburn [Diarrhea] : diarrhea [Told you have blood in urine on a urine test] : told blood was present in a urine test [Urine retention] : urine retention [Wake up at night to urinate  How many times?  ___] : wakes up to urinate [unfilled] times during the night [Interrupted urine stream] : interrupted urine stream [Slow urine stream] : slow urine stream [Bladder fullness after urinating] : bladder fullness after urinating [Joint Pain] : joint pain [Joint Swelling] : joint swelling [Limb Swelling] : limb swelling [Dizziness] : dizziness [Limb Weakness] : limb weakness [Difficulty Walking] : difficulty walking [Muscle Weakness] : muscle weakness [Feelings Of Weakness] : feelings of weakness [Negative] : Heme/Lymph

## 2019-09-05 NOTE — PROCEDURE
[Cystoscopy] : cystoscopy [LUTS] : lower urinary tract symptoms [Previous Bladder CA] : previous bladder cancer [Patient] : the patient [Last Aspirin Dose ____] : Reviewed last aspirin dose: [unfilled] [Allergies Reviewed] : Allergies reviewed [Pt took necessary Preparations and Antibiotics for Procedure] : pt took necessary preparations and antibiotics for procedure [None] : none [Ciprofloxacin] : Ciprofloxacin [Supine] : supine [Betadine] : with betadine [Intraurethral 2% Lidocaine Gel ___ (cc)] : [unfilled] cc of 2% Lidocaine Gel was administered intraurethrally  [Flexible Cystoscope] : A flexible cystoscope was used to visualize the urethra and bladder. [Normal] : was normal [Length ___ cm] : was estimated to be [unfilled] cm in length [Trilobar Hypertrophy] : had an enlargement of the lateral and median prostatic lobes [Obstructed] : was obstructed [] : had bloody efflux bilaterally [de-identified] : trabeculation [3] : a grade 3 [Multiple] : diverticula were present in the bladder wall [Instrumented] : an instrumented [Cytology] : cytology [No Complications] : There were no complications [Tolerated Well] : the patient tolerated the procedure well [Post procedure instructions and information given] : post procedure instructions and information given and reviewed with patient. [1] : 1 [Time Patient Entered Room ___] : patient entered room: [unfilled] [Time Out ___] : time out occurred at [unfilled] as per policy [Procedure Start Time ___] : procedure start time: [unfilled] [Procedure Stop Time ___] : procedure stop time: [unfilled] [Time Patient Exited Room ___] : patient exited room: [unfilled] [Location: ___] : and was located on the [unfilled] [Locations: ___] : and were located on the [unfilled] [Ciprofloxacin] : Ciprofloxacin [unfilled] [FreeTextEntry3] : LIZZ Scanoln [de-identified] : erythematous lesion [de-identified] : left lateral wall [de-identified] : grade 3 trabeculated bladder. No tumor in the diverticula.\par Erythematous lesion not biopsied

## 2019-09-12 LAB — URINE CYTOLOGY: NORMAL

## 2019-09-13 ENCOUNTER — APPOINTMENT (OUTPATIENT)
Dept: ORTHOPEDIC SURGERY | Facility: CLINIC | Age: 74
End: 2019-09-13
Payer: MEDICARE

## 2019-09-13 DIAGNOSIS — M25.562 PAIN IN RIGHT KNEE: ICD-10-CM

## 2019-09-13 DIAGNOSIS — M25.561 PAIN IN RIGHT KNEE: ICD-10-CM

## 2019-09-13 PROCEDURE — 99203 OFFICE O/P NEW LOW 30 MIN: CPT | Mod: 25

## 2019-09-13 PROCEDURE — 20610 DRAIN/INJ JOINT/BURSA W/O US: CPT | Mod: 50

## 2019-09-13 PROCEDURE — 73564 X-RAY EXAM KNEE 4 OR MORE: CPT | Mod: 50

## 2019-09-13 NOTE — ADDENDUM
[FreeTextEntry1] : This note was written by Christine Renner on 09/13/2019 acting as scribe for Dr. Tu Rios M.D.\par \par I, Dr. Tu Rios M.D., have read and attest that all the information, medical decision making and discharge instructions within are true and accurate.\par

## 2019-09-13 NOTE — DISCUSSION/SUMMARY
[de-identified] : Discussed at length the nature of the patients condition. Their bilateral knee symptoms appear secondary to degenerative arthritis, especially at the patellofemoral joint.  We reviewed operative and nonoperative treatment. While I believe he might eventually benefit from bilateral TKR, he is not a surgical candidate due to his multiple comorbidities as detailed above. Therefore, we will continue nonoperatively. We will seek authorization for bilateral knee Euflexxa injections. Once we receive authorization, we will proceed accordingly. In the interim, patient was given bilateral knee cortisone injections today as detailed above for symptomatic relief. I also suggested PT and Tylenol Arthritis Strength.  He cannot take anti-inflammatory medications due to being on Plavix. They can continue activities as tolerated.

## 2019-09-13 NOTE — HISTORY OF PRESENT ILLNESS
[de-identified] : 74 year old male presents for initial evaluation of bilateral knee pain for several years, worse over the last 6 months. Patient denies any specific injury. His pain is mostly achy with occasional stabbing. His pain is mostly diffuse with mild swelling, buckling, clicking, and locking. He walks 1-2 blocks with a cane and takes the stairs one at a time using the handrail and tries to avoid them if possible. He is on Asprin and Plavix for anticoagulation for stents in his bilateral LE. He had a right knee surgical arthroscopy followed by PT with no help. He has tried viscosupplementation which provided some relief, but has not received steroid injections. Today, he would like to discuss his treatment options with Dr. Rios.

## 2019-09-13 NOTE — PHYSICAL EXAM
[de-identified] : General appearance: well nourished and hydrated, pleasant, alert and oriented x 3, cooperative.\par HEENT: Normocephalic, EOM intact, Nasal septum midline, Oral cavity clear, External auditory canal clear.\par Cardiovascular: bilateral lower leg edema, no varicosities, pedal pulses are trace palpable.\par Lymphatics Lymph nodes: none palpated, Lymphedema: not present.\par Neurologic: sensation is normal, no muscle weakness in upper or lower extremities, patella tendon reflexes intact .\par Dermatologic no apparent skin lesions, moist, warm, no rash.\par Spine: cervical spine appears normal and moves freely, thoracic spine appears normal and moves freely, limited mobility at lumbosacral spine with tenderness\par Gait: nonantalgic.\par \par Left knee\par Inspection: no effusion or erythema.\par Wounds: none.\par Alignment: normal.\par Palpation: medial tenderness on palpation.\par ROM active (in degrees): 10 degree flexion contracture, 10-90 with crepitus and discomfort\par Ligamentous laxity: all ligaments appear stable,, negative ant. drawer test, negative post. drawer test, stable to varus stress test, stable to valgus stress test. negative Lachman's test, negative pivot shift test\par Meniscal Test: negative McMurrays, negative Janes.\par Patellofemoral Alignment Test: Q angle-, normal.\par Muscle Test: good quad strength.\par Leg examination: calf is soft and non-tender.\par \par Right knee\par Inspection: no effusion or erythema.\par Wounds: none.\par Alignment: normal.\par Palpation: medial and lateral tenderness on palpation.\par ROM active (in degrees): \par Ligamentous laxity: all ligaments appear stable,, negative ant. drawer test, negative post. drawer test, stable to varus stress test, stable to valgus stress test. negative Lachman's test, negative pivot shift test\par Meniscal Test: negative McMurrays, negative Janes.\par Patellofemoral Alignment Test: Q angle-, normal.\par Muscle Test: good quad strength.\par Leg examination: calf is soft and non-tender.\par \par Left hip\par Inspection: No swelling or ecchymosis.\par Wounds: none.\par Palpation: non-tender.\par Stability: no instability.\par Strength: 5/5 all motor groups.\par ROM: no pain with FROM.\par Leg length: equal.\par \par Right hip\par Inspection: No swelling or ecchymosis.\par Wounds: none.\par Palpation: non-tender.\par Stability: no instability.\par Strength: 5/5 all motor groups.\par ROM: no pain with FROM.\par Leg length: equal.\par \par Left ankle\par Inspection: no erythema noted, no swelling noted.\par Palpation: no pain on palpation .\par ROM: FROM without crepitus.\par Muscle strength: 5/5.\par Stability: no instability noted.\par \par Right ankle\par Inspection:  no erythema noted, no swelling noted.\par ROM: FROM without crepitus.\par Palpation: no pain on palpation .\par Muscle strength: 5/5.\par Stability: no instability noted.\par \par Left foot\par Inspection: swollen with healed dorsomedial incision at1st MTP joint, swelling and ecchymosis, slight hammer deformity of 2nd toe with healed surgical incision. \par ROM: full range of motion of all joints without pain or crepitus.\par Palpation: no tenderness.\par Stability: no instability noted.\par \par Right foot\par Inspection: hallux valgus, otherwise color, texture and turgor are normal.\par ROM: full range of motion of all joints without pain or crepitus.\par Palpation: no tenderness.\par Stability: no instability noted. [de-identified] : Left knee xrays, standing AP/Lateral, Merchant, and 45 degree PA standing view, taken at the office today shows diffuse tricompartmental degenerative arthritis, decreased medial joint height, patellofemoral joint space narrowing, marginal osteophytes, bone on bone, sclerosis, degenerative cyst in trochlea consistent with severe patellofemoral arthritis, calcification posterior vessels with a vascular stent\par \par Right knee xrays, standing AP/Lateral, Merchant, and 45 degree PA standing view, taken at the office today shows  diffuse tricompartmental degenerative arthritis, decreased medial joint height, patellofemoral joint space narrowing, marginal osteophytes, bone on bone, sclerosis, degenerative cyst in trochlea consistent with severe patellofemoral arthritis, calcification posterior vessels with a vascular stent

## 2019-09-13 NOTE — PROCEDURE
[de-identified] : BILATERAL KNEE CORTISONE INJECTION\par Discussed at length with the patient the planned steroid and lidocaine injection. The risks, benefits, convalescence and alternatives were reviewed. The possible side effects discussed included but were not limited to: pain, swelling, heat and redness. There symptoms are generally mild but if they are extensive then contact the office. Giving pain relievers by mouth such as NSAID’s or Tylenol can generally treat the reactions to  steroid and lidocaine. Rare cases of infection have been noted. Rash, hives and itching may occur post injection. If you have muscle pain or cramps, flushing and or swelling of the face, rapid heart beat, nausea, dizziness, fever, chills, headache, difficulty breathing, swelling in the arms or legs, or have a prickly feeling of your skin, contact a health care provider immediately.\par  \par Following this discussion, the knee was prepped with betadine and under sterile conditions 9 cc of 1% lidocaine and 1 cc (40 mg) of Depo-Medrol were injected with a 21 gauge needle. The needle was introduced into the joint, aspiration was performed to ensure intra-articular placement and the medication was injected. Upon withdrawal of the needle the site was cleaned with alcohol and a bandaid applied. The patient tolerated the injection well and there were no adverse effects. Post injection instructions included no strenuous activity for 24 hours, cryotherapy and if there are any adverse effects to contact the office.

## 2019-09-18 ENCOUNTER — APPOINTMENT (OUTPATIENT)
Dept: ORTHOPEDIC SURGERY | Facility: CLINIC | Age: 74
End: 2019-09-18
Payer: MEDICARE

## 2019-09-18 PROCEDURE — 20610 DRAIN/INJ JOINT/BURSA W/O US: CPT | Mod: 50

## 2019-09-18 NOTE — PROCEDURE

## 2019-09-25 ENCOUNTER — APPOINTMENT (OUTPATIENT)
Dept: ORTHOPEDIC SURGERY | Facility: CLINIC | Age: 74
End: 2019-09-25
Payer: MEDICARE

## 2019-09-25 ENCOUNTER — APPOINTMENT (OUTPATIENT)
Dept: UROLOGY | Facility: CLINIC | Age: 74
End: 2019-09-25
Payer: MEDICARE

## 2019-09-25 PROCEDURE — 99213 OFFICE O/P EST LOW 20 MIN: CPT

## 2019-09-25 PROCEDURE — 20610 DRAIN/INJ JOINT/BURSA W/O US: CPT | Mod: 50

## 2019-09-25 NOTE — PHYSICAL EXAM
[General Appearance - Well Nourished] : well nourished [General Appearance - Well Developed] : well developed [Normal Appearance] : normal appearance [Well Groomed] : well groomed [General Appearance - In No Acute Distress] : no acute distress [Abdomen Soft] : soft [Abdomen Tenderness] : non-tender [Costovertebral Angle Tenderness] : no ~M costovertebral angle tenderness [Urinary Bladder Findings] : the bladder was normal on palpation [Scrotum] : the scrotum was normal [Urethral Meatus] : meatus normal [Testes Mass (___cm)] : there were no testicular masses [Edema] : no peripheral edema [] : no respiratory distress [Exaggerated Use Of Accessory Muscles For Inspiration] : no accessory muscle use [Respiration, Rhythm And Depth] : normal respiratory rhythm and effort [Mood] : the mood was normal [Oriented To Time, Place, And Person] : oriented to person, place, and time [Affect] : the affect was normal [Not Anxious] : not anxious [Normal Station and Gait] : the gait and station were normal for the patient's age [No Focal Deficits] : no focal deficits [No Palpable Adenopathy] : no palpable adenopathy

## 2019-09-25 NOTE — PROCEDURE

## 2019-09-27 LAB
APPEARANCE: CLEAR
BACTERIA UR CULT: NORMAL
BACTERIA: NEGATIVE
BILIRUBIN URINE: NEGATIVE
BLOOD URINE: NEGATIVE
COLOR: YELLOW
GLUCOSE QUALITATIVE U: NEGATIVE
HYALINE CASTS: 0 /LPF
KETONES URINE: NEGATIVE
LEUKOCYTE ESTERASE URINE: NEGATIVE
MICROSCOPIC-UA: NORMAL
NITRITE URINE: NEGATIVE
PH URINE: 6
PROTEIN URINE: NEGATIVE
RED BLOOD CELLS URINE: 3 /HPF
SPECIFIC GRAVITY URINE: 1.02
SQUAMOUS EPITHELIAL CELLS: 0 /HPF
UROBILINOGEN URINE: NORMAL
WHITE BLOOD CELLS URINE: 1 /HPF

## 2019-10-02 ENCOUNTER — APPOINTMENT (OUTPATIENT)
Dept: ORTHOPEDIC SURGERY | Facility: CLINIC | Age: 74
End: 2019-10-02
Payer: MEDICARE

## 2019-10-02 VITALS — BODY MASS INDEX: 29.33 KG/M2 | WEIGHT: 198 LBS | HEIGHT: 69 IN

## 2019-10-02 PROCEDURE — 20610 DRAIN/INJ JOINT/BURSA W/O US: CPT | Mod: 50

## 2019-10-02 NOTE — PROCEDURE

## 2019-10-21 ENCOUNTER — APPOINTMENT (OUTPATIENT)
Dept: VASCULAR SURGERY | Facility: CLINIC | Age: 74
End: 2019-10-21
Payer: MEDICARE

## 2019-10-21 PROCEDURE — 99213 OFFICE O/P EST LOW 20 MIN: CPT

## 2019-10-21 PROCEDURE — 93925 LOWER EXTREMITY STUDY: CPT

## 2019-10-21 NOTE — ASSESSMENT
[FreeTextEntry1] : 75 yo male with history of pad presents for follow up without any new complaints.\par arterial duplex shows stable right pop 50% stenosis left sfa with stable 75% stenosis and new 75% in stent stenosis of the pop \par given no rest pain or open wounds would continue to monitor pt to continue with medical management and will follow up in 3 months with repeat duplex

## 2019-10-21 NOTE — HISTORY OF PRESENT ILLNESS
[FreeTextEntry1] : 75 yo male with history of pad presents for follow up without any new complaints.  pt with chronic back pain and b/l lower extremity stiffness with arthritic issues in b/l knees.  pt reports pain in the knees.  pt denies any lower extremity claudications.  pt denies any lower extremity wounds or rest pain.  \par pt currently on asa plavix and statin\par pt states that he recently had surgery of the left foot, incision had healed nicely without any complications

## 2019-10-21 NOTE — PHYSICAL EXAM
[JVD] : no jugular venous distention  [Ankle Swelling (On Exam)] : not present [Varicose Veins Of Lower Extremities] : not present [] : not present [No Rash or Lesion] : No rash or lesion [Skin Ulcer] : no ulcer [Alert] : alert [Calm] : calm [de-identified] : appears well [FreeTextEntry1] : b/l feet are warm

## 2020-01-03 ENCOUNTER — APPOINTMENT (OUTPATIENT)
Dept: ORTHOPEDIC SURGERY | Facility: CLINIC | Age: 75
End: 2020-01-03
Payer: MEDICARE

## 2020-01-03 PROCEDURE — 99213 OFFICE O/P EST LOW 20 MIN: CPT | Mod: 25

## 2020-01-03 PROCEDURE — 73564 X-RAY EXAM KNEE 4 OR MORE: CPT | Mod: 50

## 2020-01-03 PROCEDURE — 20610 DRAIN/INJ JOINT/BURSA W/O US: CPT | Mod: 50

## 2020-01-03 NOTE — PROCEDURE
[de-identified] : BILATERAL KNEE ZILRETTA INJECTION\par Discussed at length with the patient the planned steroid and lidocaine injection. The risks, benefits, convalescence and alternatives were reviewed. The possible side effects discussed included but were not limited to: pain, swelling, heat and redness. There symptoms are generally mild but if they are extensive then contact the office. Giving pain relievers by mouth such as NSAID’s or Tylenol can generally treat the reactions to  steroid and lidocaine. Rare cases of infection have been noted. Rash, hives and itching may occur post injection. If you have muscle pain or cramps, flushing and or swelling of the face, rapid heart beat, nausea, dizziness, fever, chills, headache, difficulty breathing, swelling in the arms or legs, or have a prickly feeling of your skin, contact a health care provider immediately.\par  \par Following this discussion, the knee was prepped with betadine and under sterile conditions 5 cc of 1% lidocaine and 5 cc Zilretta were injected with a 21 gauge needle. The needle was introduced into the joint, aspiration was performed to ensure intra-articular placement and the medication was injected. Upon withdrawal of the needle the site was cleaned with alcohol and a bandaid applied. The patient tolerated the injection well and there were no adverse effects. Post injection instructions included no strenuous activity for 24 hours, cryotherapy and if there are any adverse effects to contact the office.

## 2020-01-03 NOTE — ADDENDUM
[FreeTextEntry1] : This note was written by Christine Renner on 01/03/2020 acting as scribe for Dr. Tu Rios M.D.\par \par I, Dr. Tu Rios M.D., have read and attest that all the information, medical decision making and discharge instructions within are true and accurate.

## 2020-01-03 NOTE — DISCUSSION/SUMMARY
[de-identified] : Discussed at length the nature of the patients condition. Their bilateral knee symptoms appear secondary to degenerative arthritis, especially at the patellofemoral joint.  We reviewed operative and nonoperative treatment. While I believe he might eventually benefit from bilateral TKR, he is not a surgical candidate due to his multiple comorbidities as previously detailed above. Therefore, we will continue nonoperatively. He had no improvement with viscosupplementation. Patient was given bilateral knee interarticular Zilretta injections as detailed above for symptomatic relief.  I also suggested PT and Tylenol Arthritis Strength.  He cannot take anti-inflammatory medications due to being on Plavix. They can continue activities as tolerated.

## 2020-01-03 NOTE — HISTORY OF PRESENT ILLNESS
[de-identified] : 74 year old male presents for follow up evaluation of bilateral knee pain due to osteoarthritis. He reports he had no relief of his symptoms with cortisone injection or with viscosupplementation at the end of September and October 2018, and would like to discuss other options. Patient would like to discuss other injection therapy. He continues to report limited motion and pain within both knees that impact his activities.

## 2020-01-03 NOTE — PHYSICAL EXAM
[de-identified] : Left knee xrays, standing AP/Lateral, Merchant, and 45 degree PA standing view, taken at the office today shows diffuse tricompartmental degenerative arthritis, decreased medial joint height, patellofemoral joint space narrowing, marginal osteophytes, bone on bone, sclerosis, degenerative cyst in trochlea consistent with severe patellofemoral arthritis, calcification posterior vessels with a vascular stent\par \par Right knee xrays, standing AP/Lateral, Merchant, and 45 degree PA standing view, taken at the office today shows  diffuse tricompartmental degenerative arthritis, decreased medial joint height, patellofemoral joint space narrowing, marginal osteophytes, bone on bone, sclerosis, degenerative cyst in trochlea consistent with severe patellofemoral arthritis, calcification posterior vessels with a vascular stent [de-identified] : Left knee\par Inspection: no effusion or erythema.\par Wounds: none.\par Alignment: normal.\par Palpation: medial tenderness on palpation.\par ROM active (in degrees): 10 degree flexion contracture, 10-90 with crepitus and discomfort\par Ligamentous laxity: all ligaments appear stable,, negative ant. drawer test, negative post. drawer test, stable to varus stress test, stable to valgus stress test. negative Lachman's test, negative pivot shift test\par Meniscal Test: negative McMurrays, negative Janes.\par Patellofemoral Alignment Test: Q angle-, normal.\par Muscle Test: good quad strength.\par Leg examination: calf is soft and non-tender.\par \par Right knee\par Inspection: no effusion or erythema.\par Wounds: none.\par Alignment: normal.\par Palpation: medial and lateral tenderness on palpation.\par ROM active (in degrees): 10 degree flexion contracture, 10-90 with crepitus and discomfort\par Ligamentous laxity: all ligaments appear stable,, negative ant. drawer test, negative post. drawer test, stable to varus stress test, stable to valgus stress test. negative Lachman's test, negative pivot shift test\par Meniscal Test: negative McMurrays, negative Janes.\par Patellofemoral Alignment Test: Q angle-, normal.\par Muscle Test: good quad strength.\par Leg examination: calf is soft and non-tender.

## 2020-01-23 ENCOUNTER — APPOINTMENT (OUTPATIENT)
Dept: UROLOGY | Facility: CLINIC | Age: 75
End: 2020-01-23
Payer: MEDICARE

## 2020-01-23 VITALS — TEMPERATURE: 98.2 F | DIASTOLIC BLOOD PRESSURE: 65 MMHG | SYSTOLIC BLOOD PRESSURE: 102 MMHG | HEART RATE: 83 BPM

## 2020-01-23 PROCEDURE — 52224 CYSTOSCOPY AND TREATMENT: CPT

## 2020-01-23 PROCEDURE — 99215 OFFICE O/P EST HI 40 MIN: CPT | Mod: 25

## 2020-01-23 NOTE — PROCEDURE
[Cystoscopy] : cystoscopy [Last Aspirin Dose ____] : Reviewed last aspirin dose: [unfilled] [Allergies Reviewed] : Allergies reviewed [Valves/Prosthetics ___] : Pt has the following valves and/or prosthetics: [unfilled] [Supine] : supine [Time Patient Entered Room ___] : patient entered room: [unfilled] [Betadine] : with betadine [Intraurethral 2% Lidocaine Gel ___ (cc)] : [unfilled] cc of 2% Lidocaine Gel was administered intraurethrally  [Flexible Cystoscope] : A flexible cystoscope was used to visualize the urethra and bladder. [Time Out ___] : time out occurred at [unfilled] as per policy [Procedure Start Time ___] : procedure start time: [unfilled] [Procedure Stop Time ___] : procedure stop time: [unfilled] [Time Patient Exited Room ___] : patient exited room: [unfilled] [Cysto - Biopsy] : cystoscopy for biopsy [Cysto - Fulguration] : bladder fulguration via cystoscopy [Previous Bladder CA] : previous bladder cancer [Other ___] : [unfilled] [Patient] : the patient [Consent Obtained] : written consent was obtained prior to the procedure and is detailed in the patient's record [None] : none [Ciprofloxacin] : Ciprofloxacin [Normal] : was normal [Bilobar Hypertrophy] : had enlargement of the lateral prostatic lobes [Length ___ cm] : was estimated to be [unfilled] cm in length [Obstructed] : was obstructed [de-identified] : trabeculation [] : present in the bladder wall [4] : a grade 4 [Multiple] : diverticula were present in the bladder wall [Instrumented] : an instrumented [Cytology] : cytology [# of Specimens ___] : [unfilled] [Location/Detail ___] : The biopsy specimen was obtained from [facility] [No Complications] : There were no complications [Tolerated Well] : the patient tolerated the procedure well [Ciprofloxacin] : Ciprofloxacin [unfilled] [Post procedure instructions and information given] : post procedure instructions and information given and reviewed with patient. [2] : 2 [FreeTextEntry3] : Estrella ZAMUDIO [de-identified] : polypoid lesions [de-identified] : lateral to the right ureteral orifice near the previously resected site. [de-identified] : multiple diverticula secondary to obstruction.

## 2020-01-23 NOTE — PHYSICAL EXAM
[General Appearance - Well Developed] : well developed [General Appearance - Well Nourished] : well nourished [Normal Appearance] : normal appearance [Well Groomed] : well groomed [General Appearance - In No Acute Distress] : no acute distress [Abdomen Soft] : soft [Abdomen Tenderness] : non-tender [Costovertebral Angle Tenderness] : no ~M costovertebral angle tenderness [Urethral Meatus] : meatus normal [Scrotum] : the scrotum was normal [Urinary Bladder Findings] : the bladder was normal on palpation [Testes Mass (___cm)] : there were no testicular masses [Edema] : no peripheral edema [] : no respiratory distress [Respiration, Rhythm And Depth] : normal respiratory rhythm and effort [Exaggerated Use Of Accessory Muscles For Inspiration] : no accessory muscle use [Oriented To Time, Place, And Person] : oriented to person, place, and time [Affect] : the affect was normal [Mood] : the mood was normal [Not Anxious] : not anxious [Normal Station and Gait] : the gait and station were normal for the patient's age [No Focal Deficits] : no focal deficits [No Palpable Adenopathy] : no palpable adenopathy [Epididymis] : the epididymides were normal [Testes Tenderness] : no tenderness of the testes

## 2020-01-27 ENCOUNTER — APPOINTMENT (OUTPATIENT)
Dept: VASCULAR SURGERY | Facility: CLINIC | Age: 75
End: 2020-01-27

## 2020-01-28 LAB — CORE LAB BIOPSY: NORMAL

## 2020-02-12 ENCOUNTER — APPOINTMENT (OUTPATIENT)
Dept: UROLOGY | Facility: CLINIC | Age: 75
End: 2020-02-12
Payer: MEDICARE

## 2020-02-12 VITALS — SYSTOLIC BLOOD PRESSURE: 114 MMHG | TEMPERATURE: 97.8 F | DIASTOLIC BLOOD PRESSURE: 72 MMHG | HEART RATE: 89 BPM

## 2020-02-12 DIAGNOSIS — D41.4 NEOPLASM OF UNCERTAIN BEHAVIOR OF BLADDER: ICD-10-CM

## 2020-02-12 PROCEDURE — 99213 OFFICE O/P EST LOW 20 MIN: CPT

## 2020-02-12 NOTE — HISTORY OF PRESENT ILLNESS
[FreeTextEntry1] : The patient is a 73 year old male who presents for a Follow-up for Bladder cancer. Date first diagnosed: (2001). TNM Staging - T: T1 ; N: N0 ; M: M0 .  Grade: G2. Residual tumor: unknown. Cell type: transitional cell carcinoma. Treatment components: transurethral resection and mitomycin. Diagnostic tests include cystoscopy and cytology. Note for "Bladder cancer": patient recalls having bladder tumor in 2001, treated with intravesical therapy. 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 Pathology : negative\par 04/05/2017 He was here for evaluation of effect of Ceftin\par 04/12/2017 Urine Culture results: No Growth\par 04/24/2017: urine culture : no growth\par Mitomycin not available, therefore will start BCG\par \par 06/13/2017  1st BCG instillation was given.\par LOT: [id number]\par Exp: Feb 07, 2018\par \par 06/22/2017  2nd BCG live instillation WAS GIVEN.\par 06/29/2017 3RD BCG live instillation was given.\par 07/06/2017 4th BCG live instillation was given\par 07/13/2017 5th BCG live instillation was given.\par 07/27/2017 6th BCG live instillation was given.\par \par 09/06/2017 he is here for cystoscopy & cytology\par 11/01/2017 He was here for Pathology result.\par Bladder: Bladder wall with benign urothelial lining and minimal chronic inflammation.\par Urine Cytology: Negative.\par \par 12/11/2017 Cysto was done.\par 01/11/2017 He is here for Pathology result.\par Bladder: Bladder wall with benign urothelial loning and focal small von brunn nest.\par Urine Cytology: Atypical findings.\par \par 03/14/2018 Cysto, cyt and B-sono was done.\par Pathology result:\par Bladder: Bladder mucosa with benign urothelial lining.\par Urine cyology: Acellular specimen\par \par 05/23/2018 He is here for Cysto and Cyt.\par Pathology result:\par Bladder: Bladder mucosa with benign urothelial lining.\par Urine cystology: Negative.\par \par 08/08/2018 Uroflo and B-sono was done.\par \par 10/03/2018 cysto and cytology was done.\par \par 10/17/2018 He was here for Pathology result, Uroflow and B-sono.\par Pathology result: Bladder mucosa with beingn urothelial lining and focal von brunn nest.\par Urine Cytology: Atypical fingings.\par \par 01/23/2019  Uroflo and cystoscopy was done.\par \par 02/07/2019 He was here for Pathology result.\par Bladder: Bladder mucosa with mild chronic inflammation\par Urine cytology: Negative.\par \par 05/08/2019 Cystoscopy and Cytology was done.\par \par 06/12/2019 He is here for Pathology result, Uroflo and B-sono.\par Bladder: BLADDER MUCOSA WITH BENIGN UROTHELIAL LINING\par Urine Cytology: Atypical findings\par \par Cysto and cytology have been negative. Here for Cysto and cytology\par 01/23/2020: cystoscopy and cytology , possible biopsy\par \par 2/12/2020: 73 y/o male presenting for follow-up for bladder ca.\par Cysto, biopsy done on 01/23/2020: path: benign.\par will check urine r/o infection\par  Continuing Tamsulosin.

## 2020-02-12 NOTE — ADDENDUM
[FreeTextEntry1] : I, Christie Innocent, acted solely as a scribe for Dr. Peter Henning on this date, 2/12/2020.\par \par All medical record entries made by the Scribe were at my Dr. Peter Henning direction and personally dictated by me on, 2/12/2020. I have reviewed the chart and agree that the record accurately reflects my personal performance of the history, physical exam, assessment and plan. I have also personally directed, reviewed and agreed with the chart.\par

## 2020-02-12 NOTE — PHYSICAL EXAM
[General Appearance - Well Developed] : well developed [General Appearance - Well Nourished] : well nourished [Well Groomed] : well groomed [Normal Appearance] : normal appearance [General Appearance - In No Acute Distress] : no acute distress [Abdomen Soft] : soft [Costovertebral Angle Tenderness] : no ~M costovertebral angle tenderness [Abdomen Tenderness] : non-tender [Urinary Bladder Findings] : the bladder was normal on palpation [Urethral Meatus] : meatus normal [Scrotum] : the scrotum was normal [Testes Mass (___cm)] : there were no testicular masses [Edema] : no peripheral edema [] : no respiratory distress [Exaggerated Use Of Accessory Muscles For Inspiration] : no accessory muscle use [Respiration, Rhythm And Depth] : normal respiratory rhythm and effort [Oriented To Time, Place, And Person] : oriented to person, place, and time [Affect] : the affect was normal [Mood] : the mood was normal [Not Anxious] : not anxious [Normal Station and Gait] : the gait and station were normal for the patient's age [No Focal Deficits] : no focal deficits [No Palpable Adenopathy] : no palpable adenopathy

## 2020-02-13 ENCOUNTER — APPOINTMENT (OUTPATIENT)
Dept: VASCULAR SURGERY | Facility: CLINIC | Age: 75
End: 2020-02-13
Payer: MEDICARE

## 2020-02-13 LAB
APPEARANCE: CLEAR
BACTERIA: NEGATIVE
BILIRUBIN URINE: NEGATIVE
BLOOD URINE: NEGATIVE
COLOR: NORMAL
GLUCOSE QUALITATIVE U: NEGATIVE
HYALINE CASTS: 0 /LPF
KETONES URINE: NEGATIVE
LEUKOCYTE ESTERASE URINE: NEGATIVE
MICROSCOPIC-UA: NORMAL
NITRITE URINE: NEGATIVE
PH URINE: 6.5
PROTEIN URINE: NEGATIVE
RED BLOOD CELLS URINE: 1 /HPF
SPECIFIC GRAVITY URINE: 1.01
SQUAMOUS EPITHELIAL CELLS: 0 /HPF
UROBILINOGEN URINE: NORMAL
WHITE BLOOD CELLS URINE: 0 /HPF

## 2020-02-13 PROCEDURE — 93925 LOWER EXTREMITY STUDY: CPT

## 2020-02-14 LAB — BACTERIA UR CULT: NORMAL

## 2020-02-14 RX ORDER — CLOPIDOGREL BISULFATE 75 MG/1
75 TABLET, FILM COATED ORAL DAILY
Qty: 90 | Refills: 3 | Status: ACTIVE | COMMUNITY
Start: 2017-04-03 | End: 1900-01-01

## 2020-04-03 ENCOUNTER — APPOINTMENT (OUTPATIENT)
Dept: ORTHOPEDIC SURGERY | Facility: CLINIC | Age: 75
End: 2020-04-03

## 2020-04-17 ENCOUNTER — APPOINTMENT (OUTPATIENT)
Dept: ORTHOPEDIC SURGERY | Facility: CLINIC | Age: 75
End: 2020-04-17
Payer: MEDICARE

## 2020-04-17 PROCEDURE — 99441: CPT | Mod: CR

## 2020-04-17 NOTE — PROCEDURE
[FreeTextEntry1] : Bilateral knee pain responded to Zilretta injection in January now with recurrent pain since March that is limiting his activity  wants to schedule repeat cortisone injection, will schedule for bilateral knee cortisone injection for next Wednesday, he will myrtle the office to arrange appointment

## 2020-04-22 ENCOUNTER — APPOINTMENT (OUTPATIENT)
Dept: ORTHOPEDIC SURGERY | Facility: CLINIC | Age: 75
End: 2020-04-22
Payer: MEDICARE

## 2020-04-22 VITALS — TEMPERATURE: 98 F

## 2020-04-22 PROCEDURE — 99213 OFFICE O/P EST LOW 20 MIN: CPT | Mod: 25

## 2020-04-22 PROCEDURE — 20610 DRAIN/INJ JOINT/BURSA W/O US: CPT | Mod: 50

## 2020-04-29 NOTE — HISTORY OF PRESENT ILLNESS
[de-identified] : 74 year old male presents for follow up evaluation of bilateral knee pain due to osteoarthritis. He has been undergoing nonoperative treatment with Euflexxa last done in Nov 2019 with relief. Patient also reports the bilateral Zilretta injections provided in January were successful in improving his bilateral knee pain. He has been using Tramadol for his back pain which does provide some mild relief of his knee pain.

## 2020-04-29 NOTE — ADDENDUM
[FreeTextEntry1] : This note was written by Christine Renner on 04/22/2020 acting as scribe for Dr. Tu Rios M.D.\par \par I, Dr. uT Rios M.D., have read and attest that all the information, medical decision making and discharge instructions within are true and accurate.

## 2020-04-29 NOTE — PHYSICAL EXAM
[de-identified] : Left knee\par Inspection: no effusion or erythema.\par Wounds: none.\par Alignment: normal.\par Palpation: medial tenderness on palpation.\par ROM active (in degrees): 10 degree flexion contracture, 10-90 with crepitus and discomfort\par Ligamentous laxity: all ligaments appear stable,, negative ant. drawer test, negative post. drawer test, stable to varus stress test, stable to valgus stress test. negative Lachman's test, negative pivot shift test\par Meniscal Test: negative McMurrays, negative Janes.\par Patellofemoral Alignment Test: Q angle-, normal.\par Muscle Test: good quad strength.\par Leg examination: calf is soft and non-tender.\par \par Right knee\par Inspection: no effusion or erythema.\par Wounds: none.\par Alignment: normal.\par Palpation: medial and lateral tenderness on palpation.\par ROM active (in degrees): 10 degree flexion contracture, 10-90 with crepitus and discomfort\par Ligamentous laxity: all ligaments appear stable,, negative ant. drawer test, negative post. drawer test, stable to varus stress test, stable to valgus stress test. negative Lachman's test, negative pivot shift test\par Meniscal Test: negative McMurrays, negative Janes.\par Patellofemoral Alignment Test: Q angle-, normal.\par Muscle Test: good quad strength.\par Leg examination: calf is soft and non-tender. [de-identified] : Left knee xrays, standing AP/Lateral, Merchant, and 45 degree PA standing view, taken at the office today shows diffuse tricompartmental degenerative arthritis, decreased medial joint height, patellofemoral joint space narrowing, marginal osteophytes, bone on bone, sclerosis, degenerative cyst in trochlea consistent with severe patellofemoral arthritis, calcification posterior vessels with a vascular stent\par \par Right knee xrays, standing AP/Lateral, Merchant, and 45 degree PA standing view, taken at the office today shows  diffuse tricompartmental degenerative arthritis, decreased medial joint height, patellofemoral joint space narrowing, marginal osteophytes, bone on bone, sclerosis, degenerative cyst in trochlea consistent with severe patellofemoral arthritis, calcification posterior vessels with a vascular stent

## 2020-04-29 NOTE — PROCEDURE
[de-identified] : BILATERAL KNEE Cortisone INJECTION\par Discussed at length with the patient the planned steroid and lidocaine injection. The risks, benefits, convalescence and alternatives were reviewed. The possible side effects discussed included but were not limited to: pain, swelling, heat and redness. There symptoms are generally mild but if they are extensive then contact the office. Giving pain relievers by mouth such as NSAID’s or Tylenol can generally treat the reactions to  steroid and lidocaine. Rare cases of infection have been noted. Rash, hives and itching may occur post injection. If you have muscle pain or cramps, flushing and or swelling of the face, rapid heart beat, nausea, dizziness, fever, chills, headache, difficulty breathing, swelling in the arms or legs, or have a prickly feeling of your skin, contact a health care provider immediately.\par  \par Following this discussion, the knee was prepped with betadine and under sterile conditions 9 cc of 1% lidocaine and 40 mg Depomedrol were injected with a 21 gauge needle. The needle was introduced into the joint, aspiration was performed to ensure intra-articular placement and the medication was injected. Upon withdrawal of the needle the site was cleaned with alcohol and a bandaid applied. The patient tolerated the injection well and there were no adverse effects. Post injection instructions included no strenuous activity for 24 hours, cryotherapy and if there are any adverse effects to contact the office.

## 2020-04-29 NOTE — DISCUSSION/SUMMARY
[de-identified] : Discussed at length the nature of the patients condition. Their bilateral knee symptoms appear secondary to degenerative arthritis,  We reviewed operative and nonoperative treatment. While I believe he might eventually benefit from bilateral TKR, he is not a surgical candidate due to his multiple comorbidities as previously detailed. Therefore, we will continue nonoperatively. Since he has had a good prior response, we will seek authorization for another series of bilateral knee Euflexxa injections. Once we receive authorization, we will proceed accordingly. Patient was given bilateral knee cortisone injection today as detailed above for symptomatic relief.  I also suggested home exercise and Tylenol Arthritis Strength.  He cannot take anti-inflammatory medications due to being on Plavix. They can continue activities as tolerated.

## 2020-05-18 ENCOUNTER — APPOINTMENT (OUTPATIENT)
Dept: VASCULAR SURGERY | Facility: CLINIC | Age: 75
End: 2020-05-18
Payer: MEDICARE

## 2020-05-18 VITALS — TEMPERATURE: 97.4 F | BODY MASS INDEX: 29.33 KG/M2 | WEIGHT: 198 LBS | HEIGHT: 69 IN

## 2020-05-18 PROCEDURE — 93925 LOWER EXTREMITY STUDY: CPT

## 2020-05-18 PROCEDURE — 99212 OFFICE O/P EST SF 10 MIN: CPT

## 2020-05-18 NOTE — PHYSICAL EXAM
[JVD] : no jugular venous distention  [Ankle Swelling (On Exam)] : not present [] : not present [Varicose Veins Of Lower Extremities] : not present [No Rash or Lesion] : No rash or lesion [Skin Ulcer] : no ulcer [Alert] : alert [Calm] : calm [de-identified] : appears well [FreeTextEntry1] : b/l feet are warm

## 2020-05-18 NOTE — HISTORY OF PRESENT ILLNESS
[FreeTextEntry1] : 73 yo male with history of pad presents for follow up without any new complaints.  pt with chronic back pain and b/l lower extremity stiffness with arthritic issues in b/l knees.  pt reports pain in the knees.  pt denies any lower extremity claudications.  pt denies any lower extremity wounds or rest pain.  \par pt currently on asa plavix and statin\par pt states that he recently had surgery of the left foot, incision had healed nicely without any complications

## 2020-05-29 ENCOUNTER — APPOINTMENT (OUTPATIENT)
Dept: ORTHOPEDIC SURGERY | Facility: CLINIC | Age: 75
End: 2020-05-29
Payer: MEDICARE

## 2020-05-29 VITALS — BODY MASS INDEX: 29.33 KG/M2 | WEIGHT: 198 LBS | HEIGHT: 69 IN

## 2020-05-29 PROCEDURE — 20610 DRAIN/INJ JOINT/BURSA W/O US: CPT | Mod: 50

## 2020-05-29 RX ORDER — DICLOFENAC SODIUM 10 MG/G
1 GEL TOPICAL DAILY
Qty: 1 | Refills: 2 | Status: ACTIVE | COMMUNITY
Start: 2020-05-29 | End: 1900-01-01

## 2020-05-29 NOTE — PROCEDURE
[de-identified] : Bilateral Knee Euflexxa #3\par Discussed at length with the patient the planned Euflexxa injection. The risks, benefits, convalescence and alternatives were reviewed. The possible side effects discussed included but were not limited to: pain, swelling, heat and redness. There symptoms are generally mild but if they are extensive then contact the office. Giving pain relievers by mouth such as NSAID’s or Tylenol can generally treat the reactions to Euflexxa. Rare cases of infection have been noted. Rash, hives and itching may occur post injection. If you have muscle pain or cramps, flushing and or swelling of the face, rapid heart beat, nausea, dizziness, fever, chills, headache, difficulty breathing, swelling in the arms or legs, or have a prickly feeling of your skin, contact a health care provider immediately.\par \par Following this discussion, the knee was prepped with betadine and under sterile condition the Euflexxa injection was performed with a 21 gauge needle. The needle was introduced into the joint, aspiration was performed to ensure intra-articular placement and the medication was injected. Upon withdrawal of the needle the site was cleaned with alcohol and a bandaid applied. The patient tolerated the injection well and there were no adverse effects. Post injection instructions included no strenuous activity for 24 hours, cryotherapy and if there are any adverse effects to contact the office.\par

## 2020-06-05 ENCOUNTER — APPOINTMENT (OUTPATIENT)
Dept: ORTHOPEDIC SURGERY | Facility: CLINIC | Age: 75
End: 2020-06-05
Payer: MEDICARE

## 2020-06-05 VITALS — BODY MASS INDEX: 29.33 KG/M2 | WEIGHT: 198 LBS | HEIGHT: 69 IN

## 2020-06-05 PROCEDURE — 20610 DRAIN/INJ JOINT/BURSA W/O US: CPT | Mod: 50

## 2020-06-05 NOTE — PROCEDURE

## 2020-06-12 ENCOUNTER — APPOINTMENT (OUTPATIENT)
Dept: ORTHOPEDIC SURGERY | Facility: CLINIC | Age: 75
End: 2020-06-12
Payer: MEDICARE

## 2020-06-12 VITALS — BODY MASS INDEX: 29.33 KG/M2 | HEIGHT: 69 IN | WEIGHT: 198 LBS

## 2020-06-12 VITALS — TEMPERATURE: 97.7 F

## 2020-06-12 PROCEDURE — 20610 DRAIN/INJ JOINT/BURSA W/O US: CPT | Mod: 50

## 2020-06-12 NOTE — PROCEDURE

## 2020-06-18 ENCOUNTER — APPOINTMENT (OUTPATIENT)
Dept: UROLOGY | Facility: CLINIC | Age: 75
End: 2020-06-18
Payer: MEDICARE

## 2020-06-18 VITALS — DIASTOLIC BLOOD PRESSURE: 76 MMHG | SYSTOLIC BLOOD PRESSURE: 126 MMHG | HEART RATE: 80 BPM | TEMPERATURE: 98 F

## 2020-06-18 PROCEDURE — 99214 OFFICE O/P EST MOD 30 MIN: CPT | Mod: 25

## 2020-06-18 PROCEDURE — 52000 CYSTOURETHROSCOPY: CPT

## 2020-06-18 NOTE — ADDENDUM
[FreeTextEntry1] : I, Christie Innocent, acted solely as a scribe for Dr. Peter Henning on this date, 06/18/2020.\par \par All medical record entries made by the Scribe were at my Dr. Peter Henning direction and personally dictated by me on, 06/18/2020. I have reviewed the chart and agree that the record accurately reflects my personal performance of the history, physical exam, assessment and plan. I have also personally directed, reviewed and agreed with the chart.\par

## 2020-06-18 NOTE — HISTORY OF PRESENT ILLNESS
[FreeTextEntry1] : The patient is a 73 year old male who presents for a Follow-up for Bladder cancer. Date first diagnosed: (2001). TNM Staging - T: T1 ; N: N0 ; M: M0 .  Grade: G2. Residual tumor: unknown. Cell type: transitional cell carcinoma. Treatment components: transurethral resection and mitomycin. Diagnostic tests include cystoscopy and cytology. Note for "Bladder cancer": patient recalls having bladder tumor in 2001, treated with intravesical therapy. 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 Pathology : negative\par 04/05/2017 He was here for evaluation of effect of Ceftin\par 04/12/2017 Urine Culture results: No Growth\par 04/24/2017: urine culture : no growth\par Mitomycin not available, therefore will start BCG\par \par 06/13/2017  1st BCG instillation was given.\par LOT: [id number]\par Exp: Feb 07, 2018\par \par 06/22/2017  2nd BCG live instillation WAS GIVEN.\par 06/29/2017 3RD BCG live instillation was given.\par 07/06/2017 4th BCG live instillation was given\par 07/13/2017 5th BCG live instillation was given.\par 07/27/2017 6th BCG live instillation was given.\par \par 09/06/2017 he is here for cystoscopy & cytology\par 11/01/2017 He was here for Pathology result.\par Bladder: Bladder wall with benign urothelial lining and minimal chronic inflammation.\par Urine Cytology: Negative.\par \par 12/11/2017 Cysto was done.\par 01/11/2017 He is here for Pathology result.\par Bladder: Bladder wall with benign urothelial lining and focal small Von Brunn nest.\par Urine Cytology: Atypical findings.\par \par 03/14/2018 Cyto, cysto and B-sono was done.\par Pathology result:\par Bladder: Bladder mucosa with benign urothelial lining.\par Urine cyology: Acellular specimen\par \par 05/23/2018 He is here for Cysto and Cyt.\par Pathology result:\par Bladder: Bladder mucosa with benign urothelial lining.\par Urine cytology: Negative.\par \par 08/08/2018 Uroflow and B-sono was done.\par \par 10/03/2018 cysto and cytology was done.\par \par 10/17/2018 He was here for Pathology result, Uroflow and B-sono.\par Pathology result: Bladder mucosa with benign urothelial lining and focal von brunn nest.\par Urine Cytology: Atypical findings.\par \par 01/23/2019  Uroflo and cystoscopy was done.\par \par 02/07/2019 He was here for Pathology result.\par Bladder: Bladder mucosa with mild chronic inflammation\par Urine cytology: Negative.\par \par 05/08/2019 Cystoscopy and Cytology was done.\par \par 06/12/2019 He is here for Pathology result, Uroflo and B-sono.\par Bladder: BLADDER MUCOSA WITH BENIGN UROTHELIAL LINING\par Urine Cytology: Atypical findings\par \par Cysto and cytology have been negative. Here for Cysto and cytology\par 01/23/2020: cystoscopy and cytology , possible biopsy\par \par 2/12/2020: 75 y/o male presenting for follow-up for bladder ca.\par Cysto, biopsy done on 01/23/2020: path: benign.\par will check urine r/o infection\par  Continuing Tamsulosin. \par \par 06/18/2020: for follow up Cystoscopy. Cystoscopy done today. No recurrent tumor  N x 2. Daytime x 4-5. Denies hematuria and dysuria. Has interrupted stream. \par  On Tamsulosin, will continue.

## 2020-06-18 NOTE — PHYSICAL EXAM
[General Appearance - Well Developed] : well developed [General Appearance - Well Nourished] : well nourished [Normal Appearance] : normal appearance [Well Groomed] : well groomed [General Appearance - In No Acute Distress] : no acute distress [Abdomen Soft] : soft [Abdomen Tenderness] : non-tender [Urinary Bladder Findings] : the bladder was normal on palpation [Costovertebral Angle Tenderness] : no ~M costovertebral angle tenderness [Urethral Meatus] : meatus normal [Scrotum] : the scrotum was normal [Testes Mass (___cm)] : there were no testicular masses [Edema] : no peripheral edema [] : no rash [Respiration, Rhythm And Depth] : normal respiratory rhythm and effort [Oriented To Time, Place, And Person] : oriented to person, place, and time [Exaggerated Use Of Accessory Muscles For Inspiration] : no accessory muscle use [Affect] : the affect was normal [Mood] : the mood was normal [Normal Station and Gait] : the gait and station were normal for the patient's age [Not Anxious] : not anxious [No Palpable Adenopathy] : no palpable adenopathy [No Focal Deficits] : no focal deficits [Penis Abnormality] : normal circumcised penis [Testes Tenderness] : no tenderness of the testes [Epididymis] : the epididymides were normal

## 2020-07-01 ENCOUNTER — APPOINTMENT (OUTPATIENT)
Dept: ORTHOPEDIC SURGERY | Facility: CLINIC | Age: 75
End: 2020-07-01
Payer: MEDICARE

## 2020-07-01 ENCOUNTER — APPOINTMENT (OUTPATIENT)
Dept: UROLOGY | Facility: CLINIC | Age: 75
End: 2020-07-01
Payer: MEDICARE

## 2020-07-01 VITALS
SYSTOLIC BLOOD PRESSURE: 131 MMHG | WEIGHT: 201 LBS | BODY MASS INDEX: 29.68 KG/M2 | TEMPERATURE: 98.1 F | DIASTOLIC BLOOD PRESSURE: 80 MMHG | HEART RATE: 85 BPM

## 2020-07-01 DIAGNOSIS — N32.3 DIVERTICULUM OF BLADDER: ICD-10-CM

## 2020-07-01 DIAGNOSIS — S80.01XA CONTUSION OF RIGHT KNEE, INITIAL ENCOUNTER: ICD-10-CM

## 2020-07-01 DIAGNOSIS — D49.4 NEOPLASM OF UNSPECIFIED BEHAVIOR OF BLADDER: ICD-10-CM

## 2020-07-01 DIAGNOSIS — N40.1 BENIGN PROSTATIC HYPERPLASIA WITH LOWER URINARY TRACT SYMPMS: ICD-10-CM

## 2020-07-01 DIAGNOSIS — S80.02XA CONTUSION OF LEFT KNEE, INITIAL ENCOUNTER: ICD-10-CM

## 2020-07-01 PROCEDURE — 99213 OFFICE O/P EST LOW 20 MIN: CPT | Mod: 25

## 2020-07-01 PROCEDURE — 99213 OFFICE O/P EST LOW 20 MIN: CPT

## 2020-07-01 PROCEDURE — 20610 DRAIN/INJ JOINT/BURSA W/O US: CPT | Mod: 50

## 2020-07-01 PROCEDURE — 73564 X-RAY EXAM KNEE 4 OR MORE: CPT | Mod: 50

## 2020-07-01 NOTE — HISTORY OF PRESENT ILLNESS
[de-identified] : 74 year old male presents for follow up evaluation of his bilateral knees. Patient has been undergoing nonoperative treatment for osteoarthritis of both knees and concluded his most recent series 2 weeks ago. Patient states 1 week ago he fell down the stairs after skipping a step, and hit both his knees. Shortly after he had increased swelling and pain. He used Advil, though sparingly due to his use of Plavix. Patient is also on ultracet. He has been using a cane for ambulation due to his increased pain. He would like to make sure he did not sustain any fractures.

## 2020-07-01 NOTE — PHYSICAL EXAM
[General Appearance - Well Developed] : well developed [Normal Appearance] : normal appearance [General Appearance - Well Nourished] : well nourished [Well Groomed] : well groomed [Abdomen Soft] : soft [General Appearance - In No Acute Distress] : no acute distress [Abdomen Tenderness] : non-tender [Costovertebral Angle Tenderness] : no ~M costovertebral angle tenderness [Urethral Meatus] : meatus normal [Penis Abnormality] : normal circumcised penis [Urinary Bladder Findings] : the bladder was normal on palpation [Scrotum] : the scrotum was normal [Epididymis] : the epididymides were normal [Testes Tenderness] : no tenderness of the testes [Testes Mass (___cm)] : there were no testicular masses [Edema] : no peripheral edema [] : no respiratory distress [Respiration, Rhythm And Depth] : normal respiratory rhythm and effort [Exaggerated Use Of Accessory Muscles For Inspiration] : no accessory muscle use [Oriented To Time, Place, And Person] : oriented to person, place, and time [Affect] : the affect was normal [Mood] : the mood was normal [Not Anxious] : not anxious [Normal Station and Gait] : the gait and station were normal for the patient's age [No Focal Deficits] : no focal deficits [No Palpable Adenopathy] : no palpable adenopathy

## 2020-07-01 NOTE — DISCUSSION/SUMMARY
[de-identified] : Discussed at length the nature of the patients condition. Their bilateral knee symptoms appear secondary to degenerative arthritis with a recent contusion which aggravated his arthritic pain. I reviewed x-ray films with them which revealed no fractures.  Patient was given bilateral knee cortisone injection today as detailed above for symptomatic relief.  I also suggested home exercise and Tylenol Arthritis Strength.  He cannot take anti-inflammatory medications due to being on Plavix. They can continue activities as tolerated. Patient may follow up with x-rays in 3 months for his previously scheduled appt.

## 2020-07-01 NOTE — HISTORY OF PRESENT ILLNESS
[FreeTextEntry1] : The patient is a 73 year old male who presents for a Follow-up for Bladder cancer. Date first diagnosed: (2001). TNM Staging - T: T1 ; N: N0 ; M: M0 .  Grade: G2. Residual tumor: unknown. Cell type: transitional cell carcinoma. Treatment components: transurethral resection and mitomycin. Diagnostic tests include cystoscopy and cytology. Note for "Bladder cancer": patient recalls having bladder tumor in 2001, treated with intravesical therapy. 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 Pathology : negative\par 04/05/2017 He was here for evaluation of effect of Ceftin\par 04/12/2017 Urine Culture results: No Growth\par 04/24/2017: urine culture : no growth\par Mitomycin not available, therefore will start BCG\par \par 06/13/2017  1st BCG instillation was given.\par LOT: [id number]\par Exp: Feb 07, 2018\par \par 06/22/2017  2nd BCG live instillation WAS GIVEN.\par 06/29/2017 3RD BCG live instillation was given.\par 07/06/2017 4th BCG live instillation was given\par 07/13/2017 5th BCG live instillation was given.\par 07/27/2017 6th BCG live instillation was given.\par \par 09/06/2017 he is here for cystoscopy & cytology\par 11/01/2017 He was here for Pathology result.\par Bladder: Bladder wall with benign urothelial lining and minimal chronic inflammation.\par Urine Cytology: Negative.\par \par 12/11/2017 Cysto was done.\par 01/11/2017 He is here for Pathology result.\par Bladder: Bladder wall with benign urothelial lining and focal small Von Brunn nest.\par Urine Cytology: Atypical findings.\par \par 03/14/2018 Cyto, cysto and B-sono was done.\par Pathology result:\par Bladder: Bladder mucosa with benign urothelial lining.\par Urine cyology: Acellular specimen\par \par 05/23/2018 He is here for Cysto and Cyt.\par Pathology result:\par Bladder: Bladder mucosa with benign urothelial lining.\par Urine cytology: Negative.\par \par 08/08/2018 Uroflow and B-sono was done.\par \par 10/03/2018 cysto and cytology was done.\par \par 10/17/2018 He was here for Pathology result, Uroflow and B-sono.\par Pathology result: Bladder mucosa with benign urothelial lining and focal von brunn nest.\par Urine Cytology: Atypical findings.\par \par 01/23/2019  Uroflo and cystoscopy was done.\par \par 02/07/2019 He was here for Pathology result.\par Bladder: Bladder mucosa with mild chronic inflammation\par Urine cytology: Negative.\par \par 05/08/2019 Cystoscopy and Cytology was done.\par \par 06/12/2019 He is here for Pathology result, Uroflo and B-sono.\par Bladder: BLADDER MUCOSA WITH BENIGN UROTHELIAL LINING\par Urine Cytology: Atypical findings\par \par Cysto and cytology have been negative. Here for Cysto and cytology\par 01/23/2020: cystoscopy and cytology , possible biopsy\par \par 2/12/2020: 73 y/o male presenting for follow-up for bladder ca.\par Cysto, biopsy done on 01/23/2020: path: benign.\par will check urine r/o infection\par  Continuing Tamsulosin. \par \par 06/18/2020: for follow up Cystoscopy. Cystoscopy done today. No recurrent tumor  N x 2. Daytime x 4-5. Denies hematuria and dysuria. Has interrupted stream. \par  On Tamsulosin, will continue.\par \par 07/01/2020: Here today for cystoscopy follow up. Cystoscopy on 06/18/2020: gr III trabeculated bladder with saccules cellules present. Enlarged prostate.. No tumor. One suspicious ? polyp at the left ureteral orifice. Will repeat cystoscopy in 3 months \par \par  N x 1.Nocturia decreased. Voiding well. On Tamsulosin; will continue. Will get UA, culture and cytology\par \par

## 2020-07-01 NOTE — PHYSICAL EXAM
[de-identified] : Constitutional: Height and weight as reported by patient. Well groomed and in no apparent respiratory distress.\par Cardiovascular: No lower extremity edema. No lower extremity varicosities.\par Neurologic/Psychiatric: Oriented to time, place and person. Mood and affect within normal limits.\par Skin: No observed rashes on lower extremities. \par Gait: nonantalgic with no assistive device, arising from the seated position independently.\par Spine: cervical spine appears normal and moves freely, thoracic spine appears normal and moves freely, lumbosacral spine appears normal and moves freely. \par \par Left knee\par Inspection: moderate effusion \par Wounds: none.\par Alignment: normal.\par Palpation: medial, lateral, and peripatellar tenderness on palpation.\par ROM active (in degrees): 10 degree flexion contracture, 10-90 with pain and crepitus\par Ligamentous laxity: all ligaments appear stable,, negative ant. drawer test, negative post. drawer test, stable to varus stress test, stable to valgus stress test. negative Lachman's test, negative pivot shift test\par Meniscal Test: negative McMurrays, negative Janes.\par Patellofemoral Alignment Test: Q angle-, normal.\par Muscle Test: good quad strength.\par Leg examination: calf is soft and non-tender.\par \par Right knee\par Inspection: moderate effusion \par Wounds: none.\par Alignment: normal.\par Palpation: medial, lateral, and peripatellar tenderness on palpation.\par ROM active (in degrees): 10 degree flexion contracture, 10-90 with pain and crepitus\par Ligamentous laxity: all ligaments appear stable,, negative ant. drawer test, negative post. drawer test, stable to varus stress test, stable to valgus stress test. negative Lachman's test, negative pivot shift test\par Meniscal Test: negative McMurrays, negative Janes.\par Patellofemoral Alignment Test: Q angle-, normal.\par Muscle Test: good quad strength.\par Leg examination: calf is soft and non-tender. [de-identified] : Left knee xrays, standing AP/Lateral, Merchant, and 45 degree PA standing view, taken at the office today shows diffuse tricompartmental degenerative arthritis, decreased medial joint height, patellofemoral joint space narrowing, marginal osteophytes, bone on bone, sclerosis, degenerative cyst in trochlea consistent with severe patellofemoral arthritis, calcification posterior vessels with a vascular stent, no fractures seen. \par \par Right knee xrays, standing AP/Lateral, Merchant, and 45 degree PA standing view, taken at the office today shows  diffuse tricompartmental degenerative arthritis, decreased medial joint height, patellofemoral joint space narrowing, marginal osteophytes, bone on bone, sclerosis, degenerative cyst in trochlea consistent with severe patellofemoral arthritis, calcification posterior vessels with a vascular stent, no fractures seen.

## 2020-07-01 NOTE — ADDENDUM
[FreeTextEntry1] : I, Christie Innocent, acted solely as a scribe for Dr. Peter Henning on this date, 07/01/2020.\par \par All medical record entries made by the Scribe were at my Dr. Peter Henning direction and personally dictated by me on, 07/01/2020. I have reviewed the chart and agree that the record accurately reflects my personal performance of the history, physical exam, assessment and plan. I have also personally directed, reviewed and agreed with the chart.\par

## 2020-07-01 NOTE — PROCEDURE
[de-identified] : BILATERAL KNEE Cortisone INJECTION\par Discussed at length with the patient the planned steroid and lidocaine injection. The risks, benefits, convalescence and alternatives were reviewed. The possible side effects discussed included but were not limited to: pain, swelling, heat and redness. There symptoms are generally mild but if they are extensive then contact the office. Giving pain relievers by mouth such as NSAID’s or Tylenol can generally treat the reactions to  steroid and lidocaine. Rare cases of infection have been noted. Rash, hives and itching may occur post injection. If you have muscle pain or cramps, flushing and or swelling of the face, rapid heart beat, nausea, dizziness, fever, chills, headache, difficulty breathing, swelling in the arms or legs, or have a prickly feeling of your skin, contact a health care provider immediately.\par  \par Following this discussion, the knee was prepped with betadine and under sterile conditions 9 cc of 1% lidocaine and 40 mg Depomedrol were injected with a 21 gauge needle. The needle was introduced into the joint, aspiration was performed to ensure intra-articular placement and the medication was injected. Upon withdrawal of the needle the site was cleaned with alcohol and a bandaid applied. The patient tolerated the injection well and there were no adverse effects. Post injection instructions included no strenuous activity for 24 hours, cryotherapy and if there are any adverse effects to contact the office.

## 2020-07-06 LAB
APPEARANCE: CLEAR
BACTERIA UR CULT: NORMAL
BACTERIA: NEGATIVE
BILIRUBIN URINE: NEGATIVE
BLOOD URINE: NEGATIVE
COLOR: NORMAL
GLUCOSE QUALITATIVE U: NEGATIVE
HYALINE CASTS: 1 /LPF
KETONES URINE: NEGATIVE
LEUKOCYTE ESTERASE URINE: NEGATIVE
MICROSCOPIC-UA: NORMAL
NITRITE URINE: NEGATIVE
PH URINE: 6.5
PROTEIN URINE: NEGATIVE
RED BLOOD CELLS URINE: 1 /HPF
SPECIFIC GRAVITY URINE: 1.01
SQUAMOUS EPITHELIAL CELLS: 0 /HPF
UROBILINOGEN URINE: NORMAL
WHITE BLOOD CELLS URINE: 0 /HPF

## 2020-07-10 LAB — URINE CYTOLOGY: NORMAL

## 2020-08-24 ENCOUNTER — APPOINTMENT (OUTPATIENT)
Dept: VASCULAR SURGERY | Facility: CLINIC | Age: 75
End: 2020-08-24
Payer: MEDICARE

## 2020-08-24 VITALS
HEIGHT: 69 IN | SYSTOLIC BLOOD PRESSURE: 103 MMHG | BODY MASS INDEX: 29.62 KG/M2 | DIASTOLIC BLOOD PRESSURE: 78 MMHG | WEIGHT: 200 LBS | HEART RATE: 81 BPM

## 2020-08-24 VITALS — TEMPERATURE: 97.1 F

## 2020-08-24 PROCEDURE — 93923 UPR/LXTR ART STDY 3+ LVLS: CPT

## 2020-08-24 PROCEDURE — 99212 OFFICE O/P EST SF 10 MIN: CPT

## 2020-08-24 NOTE — PHYSICAL EXAM
[No Rash or Lesion] : No rash or lesion [Alert] : alert [Calm] : calm [JVD] : no jugular venous distention  [Ankle Swelling (On Exam)] : not present [Varicose Veins Of Lower Extremities] : not present [] : not present [Skin Ulcer] : no ulcer [FreeTextEntry1] : b/l feet are warm [de-identified] : appears well

## 2020-08-24 NOTE — HISTORY OF PRESENT ILLNESS
[FreeTextEntry1] : 76 yo male with history of pad presents for follow up without any new complaints.  pt with chronic back pain and b/l lower extremity stiffness with arthritic issues in b/l knees.  pt reports pain in the knees.  pt denies any lower extremity claudications.  pt denies any lower extremity wounds or rest pain.  \par pt currently on asa plavix and statin\par pt states that he had a fall resulting in large bruise over the left anterior shin and wound that healed independently

## 2020-08-24 NOTE — ASSESSMENT
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

## 2020-09-15 RX ORDER — TAMSULOSIN HYDROCHLORIDE 0.4 MG/1
0.4 CAPSULE ORAL
Qty: 90 | Refills: 1 | Status: ACTIVE | COMMUNITY
Start: 1900-01-01 | End: 1900-01-01

## 2020-09-16 ENCOUNTER — APPOINTMENT (OUTPATIENT)
Dept: ORTHOPEDIC SURGERY | Facility: CLINIC | Age: 75
End: 2020-09-16
Payer: MEDICARE

## 2020-09-16 VITALS — WEIGHT: 200 LBS | BODY MASS INDEX: 29.62 KG/M2 | HEIGHT: 69 IN

## 2020-09-16 PROCEDURE — 73564 X-RAY EXAM KNEE 4 OR MORE: CPT | Mod: 50

## 2020-09-16 PROCEDURE — 99213 OFFICE O/P EST LOW 20 MIN: CPT | Mod: 25

## 2020-09-16 PROCEDURE — 20610 DRAIN/INJ JOINT/BURSA W/O US: CPT | Mod: 50

## 2020-09-16 NOTE — HISTORY OF PRESENT ILLNESS
[de-identified] : 75 year old male presents for follow up evaluation of his bilateral knees. Patient has been undergoing nonoperative treatment for osteoarthritis of both knees and last had Euflexxa injections 3 months ago with relief. He also had cortisone injection bilaterally in July 2020. He comes into the clinic today for possible cortisone injections. He has been using a cane for ambulation due to his increased pain.

## 2020-09-16 NOTE — PROCEDURE
[de-identified] : BILATERAL KNEE ZILRETTA INJECTION\par Discussed at length with the patient the planned steroid and lidocaine injection. The risks, benefits, convalescence and alternatives were reviewed. The possible side effects discussed included but were not limited to: pain, swelling, heat and redness. These symptoms are generally mild but if they are extensive then contact the office. Giving pain relievers by mouth such as NSAID’s or Tylenol can generally treat the reactions to steroid and lidocaine. Rare cases of infection have been noted. Rash, hives and itching may occur post injection. If you have muscle pain or cramps, flushing and or swelling of the face, rapid heart beat, nausea, dizziness, fever, chills, headache, difficulty breathing, swelling in the arms or legs, or have a prickly feeling of your skin, contact a health care provider immediately.\par \par Following this discussion, the RIGHT/LEFT knee was prepped with betadine and under sterile conditions 5 cc of 1% lidocaine and 5 cc Zilretta were injected with a 21 gauge needle. The needle was introduced into the joint, aspiration was performed to ensure intra-articular placement and the medication was injected. Upon withdrawal of the needle the site was cleaned with alcohol and a bandaid applied. The patient tolerated the injection well and there were no adverse effects. Post injection instructions included no strenuous activity for 24 hours, cryotherapy and if there are any adverse effects to contact the office.

## 2020-09-16 NOTE — PHYSICAL EXAM
[de-identified] : Constitutional: Height and weight as reported by patient. Well groomed and in no apparent respiratory distress.\par Cardiovascular: No lower extremity edema. No lower extremity varicosities.\par Neurologic/Psychiatric: Oriented to time, place and person. Mood and affect within normal limits.\par Skin: No observed rashes on lower extremities. \par Gait: nonantalgic with no assistive device, arising from the seated position independently.\par Spine: cervical spine appears normal and moves freely, thoracic spine appears normal and moves freely, lumbosacral spine appears normal and moves freely. \par \par Left knee\par Inspection: joint effusion \par Wounds: none.\par Alignment: normal.\par Palpation: medial, lateral tenderness on palpation.\par ROM active (in degrees): 10 degree flexion contracture, 10-90 with pain and crepitus\par Ligamentous laxity: all ligaments appear stable,, negative ant. drawer test, negative post. drawer test, stable to varus stress test, stable to valgus stress test. negative Lachman's test, negative pivot shift test\par Meniscal Test: negative McMurrays, negative Janes.\par Patellofemoral Alignment Test: Q angle-, normal.\par Muscle Test: good quad strength.\par Leg examination: calf is soft and non-tender.\par \par Right knee\par Inspection: joint effusion \par Wounds: none.\par Alignment: normal.\par Palpation: medial, lateral tenderness on palpation.\par ROM active (in degrees): 10 degree flexion contracture, 10-90 with pain and crepitus\par Ligamentous laxity: all ligaments appear stable,, negative ant. drawer test, negative post. drawer test, stable to varus stress test, stable to valgus stress test. negative Lachman's test, negative pivot shift test\par Meniscal Test: negative McMurrays, negative Janes.\par Patellofemoral Alignment Test: Q angle-, normal.\par Muscle Test: good quad strength.\par Leg examination: calf is soft and non-tender. [de-identified] : Left knee xrays, standing AP/Lateral, Merchant, and 45 degree PA standing view, taken at the office today shows diffuse tricompartmental degenerative arthritis, decreased medial joint height, patellofemoral joint space narrowing, marginal osteophytes, bone on bone, sclerosis, degenerative cyst in trochlea consistent with severe patellofemoral arthritis, calcification posterior vessels with a vascular stent, no fractures seen. \par \par Right knee xrays, standing AP/Lateral, Merchant, and 45 degree PA standing view, taken at the office today shows  diffuse tricompartmental degenerative arthritis, decreased medial joint height, patellofemoral joint space narrowing, marginal osteophytes, bone on bone, sclerosis, degenerative cyst in trochlea consistent with severe patellofemoral arthritis, calcification posterior vessels with a vascular stent, no fractures seen.

## 2020-09-16 NOTE — DISCUSSION/SUMMARY
[de-identified] : Discussed at length the nature of the patients condition. Their bilateral knee symptoms appear secondary to degenerative arthritis. We reviewed operative and nonoperative treatment. While I believe he would eventually benefit from a staged TKR, he is hesitant to have surgery at this time. Therefore, we will continue nonoperatively. Patient was given a bilateral knee Zilretta injection as detailed above for symptomatic relief. I also suggested home exercise, Voltaren gel and Tylenol Arthritis Strength. He cannot take anti-inflammatory medications due to being on Plavix. He can continue activities as tolerated. All questions answered, understanding verbalized. Patient in agreement with plan of care. Patient may follow up with x-rays in 3 months.

## 2020-09-16 NOTE — ADDENDUM
[FreeTextEntry1] : This note was written by Fer Pradhan on 09/16/2020 acting as scribe for Dr. Tu Rios M.D.\par \par I, Dr. Tu Rios, have read and attest that all the information, medical decision making and discharge instructions within are true and accurate.

## 2020-10-28 ENCOUNTER — APPOINTMENT (OUTPATIENT)
Dept: VASCULAR SURGERY | Facility: CLINIC | Age: 75
End: 2020-10-28
Payer: MEDICARE

## 2020-10-28 VITALS — TEMPERATURE: 96.9 F

## 2020-10-28 PROCEDURE — 93880 EXTRACRANIAL BILAT STUDY: CPT

## 2020-10-30 DIAGNOSIS — I65.21 OCCLUSION AND STENOSIS OF RIGHT CAROTID ARTERY: ICD-10-CM

## 2020-11-03 ENCOUNTER — RESULT REVIEW (OUTPATIENT)
Age: 75
End: 2020-11-03

## 2020-11-03 ENCOUNTER — APPOINTMENT (OUTPATIENT)
Dept: CT IMAGING | Facility: IMAGING CENTER | Age: 75
End: 2020-11-03

## 2020-11-03 ENCOUNTER — OUTPATIENT (OUTPATIENT)
Dept: OUTPATIENT SERVICES | Facility: HOSPITAL | Age: 75
LOS: 1 days | End: 2020-11-03
Payer: MEDICARE

## 2020-11-03 DIAGNOSIS — Z98.890 OTHER SPECIFIED POSTPROCEDURAL STATES: Chronic | ICD-10-CM

## 2020-11-03 DIAGNOSIS — N32.9 BLADDER DISORDER, UNSPECIFIED: Chronic | ICD-10-CM

## 2020-11-03 DIAGNOSIS — I65.21 OCCLUSION AND STENOSIS OF RIGHT CAROTID ARTERY: ICD-10-CM

## 2020-11-03 DIAGNOSIS — I73.9 PERIPHERAL VASCULAR DISEASE, UNSPECIFIED: Chronic | ICD-10-CM

## 2020-11-03 DIAGNOSIS — Z90.2 ACQUIRED ABSENCE OF LUNG [PART OF]: Chronic | ICD-10-CM

## 2020-11-03 PROCEDURE — 70498 CT ANGIOGRAPHY NECK: CPT | Mod: 26

## 2020-11-03 PROCEDURE — 70498 CT ANGIOGRAPHY NECK: CPT

## 2020-11-03 PROCEDURE — 82565 ASSAY OF CREATININE: CPT

## 2020-12-02 ENCOUNTER — APPOINTMENT (OUTPATIENT)
Dept: ORTHOPEDIC SURGERY | Facility: CLINIC | Age: 75
End: 2020-12-02
Payer: MEDICARE

## 2020-12-02 DIAGNOSIS — M17.0 BILATERAL PRIMARY OSTEOARTHRITIS OF KNEE: ICD-10-CM

## 2020-12-02 PROCEDURE — 20610 DRAIN/INJ JOINT/BURSA W/O US: CPT | Mod: 50

## 2020-12-02 PROCEDURE — 73564 X-RAY EXAM KNEE 4 OR MORE: CPT | Mod: 50

## 2020-12-02 PROCEDURE — 99213 OFFICE O/P EST LOW 20 MIN: CPT | Mod: 25

## 2020-12-02 NOTE — PROCEDURE
[de-identified] : BILATERAL KNEE CORTISONE INJECTION\par Discussed at length with the patient the planned steroid and lidocaine injection. The risks, benefits, convalescence and alternatives were reviewed. The possible side effects discussed included but were not limited to: pain, swelling, heat and redness. These symptoms are generally mild but if they are extensive then contact the office. Giving pain relievers by mouth such as NSAID’s or Tylenol can generally treat the reactions to steroid and lidocaine. Rare cases of infection have been noted. Rash, hives and itching may occur post injection. If you have muscle pain or cramps, flushing and or swelling of the face, rapid heart beat, nausea, dizziness, fever, chills, headache, difficulty breathing, swelling in the arms or legs, or have a prickly feeling of your skin, contact a health care provider immediately.\par \par Following this discussion, the knee was prepped with betadine and under sterile conditions 9 cc of 1% lidocaine and 1 cc depo-medrol were injected with a 21 gauge needle. The needle was introduced into the joint, aspiration was performed to ensure intra-articular placement and the medication was injected. Upon withdrawal of the needle the site was cleaned with alcohol and a bandaid applied. The patient tolerated the injection well and there were no adverse effects. Post injection instructions included no strenuous activity for 24 hours, cryotherapy and if there are any adverse effects to contact the office

## 2020-12-02 NOTE — HISTORY OF PRESENT ILLNESS
[de-identified] : 75 year old male presents for follow up evaluation of his bilateral degenerative arthritic knees. He has been undergoing nonoperative treatment with Zilretta injections last done in Sep 2020 with good relief. He would like to see if he can have injections at this point, as he has started chemotherapy 6 weeks ago and has found his knees to be weak and painful. Patient also has peripheral vascular disease, and is on Plavix. Therefore, he tries to avoid anti-inflammatory medications.

## 2020-12-02 NOTE — ADDENDUM
[FreeTextEntry1] : This note was written by Fer Pradhan on 12/02/2020 acting as scribe for Dr. Tu Rios M.D.\par \par I, Dr. Tu Rios, have read and attest that all the information, medical decision making and discharge instructions within are true and accurate.

## 2020-12-02 NOTE — DISCUSSION/SUMMARY
[de-identified] : Discussed at length the nature of the patients condition. Their bilateral knee symptoms appear secondary to degenerative arthritis. I reviewed x-ray films with them. \par \par We reviewed operative and nonoperative treatment. We will continue nonoperatively,  due to undergoing chemotherapy. At this time his symptoms are more symptomatic due to his chemotherapy. \par \par Patient was given a bilateral knee cortisone injection as detailed above for symptomatic relief. I also suggested home exercise, Voltaren gel and Tylenol Arthritis Strength. He cannot take anti-inflammatory medications due to being on Plavix. Discussed with the patient that he would need to follow up with his oncologists regarding oral steroid medications. \par \par He can continue activities as tolerated. All questions answered, understanding verbalized. Patient in agreement with plan of care. Patient may follow up with x-rays in 3 months for Zilretta injections.

## 2020-12-02 NOTE — PHYSICAL EXAM
[de-identified] : Constitutional: Height and weight as reported by patient. Well groomed and in no apparent respiratory distress.\par Cardiovascular: No lower extremity edema. No lower extremity varicosities.\par Neurologic/Psychiatric: Oriented to time, place and person. Mood and affect within normal limits.\par Skin: No observed rashes on lower extremities. \par Gait: nonantalgic with no assistive device, arising from the seated position independently.\par Spine: cervical spine appears normal and moves freely, thoracic spine appears normal and moves freely, lumbosacral spine appears normal and moves freely. \par \par Left knee\par Inspection: joint effusion \par Wounds: none.\par Alignment: normal.\par Palpation: diffuse tenderness on palpation.\par ROM active (in degrees): 10 degree flexion contracture, 10-90 with pain on extremes of flexion & extension and crepitus\par Ligamentous laxity: all ligaments appear stable,, negative ant. drawer test, negative post. drawer test, stable to varus stress test, stable to valgus stress test. negative Lachman's test, negative pivot shift test\par Meniscal Test: negative McMurrays, negative Janes.\par Patellofemoral Alignment Test: Q angle-, normal.\par Muscle Test: good quad strength.\par Leg examination: calf is soft and non-tender.\par \par Right knee\par Inspection: joint effusion \par Wounds: none.\par Alignment: normal.\par Palpation: medial, lateral tenderness on palpation.\par ROM active (in degrees): 10 degree flexion contracture, 10-90 with pain and crepitus\par Ligamentous laxity: all ligaments appear stable,, negative ant. drawer test, negative post. drawer test, stable to varus stress test, stable to valgus stress test. negative Lachman's test, negative pivot shift test\par Meniscal Test: negative McMurrays, negative Janes.\par Patellofemoral Alignment Test: Q angle-, normal.\par Muscle Test: good quad strength.\par Leg examination: calf is soft and non-tender. [de-identified] : Left knee xrays, standing AP/Lateral, Merchant, and 45 degree PA standing view, taken at the office today shows diffuse tricompartmental degenerative arthritis, decreased medial joint height, patellofemoral joint space narrowing, marginal osteophytes, bone on bone, sclerosis, degenerative cyst in trochlea consistent with severe patellofemoral arthritis, calcification posterior vessels with a vascular stent, no fractures seen. \par \par Right knee xrays, standing AP/Lateral, Merchant, and 45 degree PA standing view, taken at the office today shows  diffuse tricompartmental degenerative arthritis, decreased medial joint height, patellofemoral joint space narrowing, marginal osteophytes, bone on bone, sclerosis, degenerative cyst in trochlea consistent with severe patellofemoral arthritis, calcification posterior vessels with a vascular stent, no fractures seen.

## 2020-12-07 ENCOUNTER — APPOINTMENT (OUTPATIENT)
Dept: VASCULAR SURGERY | Facility: CLINIC | Age: 75
End: 2020-12-07

## 2020-12-09 ENCOUNTER — APPOINTMENT (OUTPATIENT)
Dept: VASCULAR SURGERY | Facility: CLINIC | Age: 75
End: 2020-12-09

## 2020-12-14 NOTE — ASU PATIENT PROFILE, ADULT - NS PRO AD INFO GIVEN Y
Health Maintenance Due   Topic Date Due    Foot Exam  02/07/2020    Influenza Vaccine (1) 08/01/2020     Updates were requested from care everywhere.  Chart was reviewed for overdue Proactive [facility] Encounters ([facility]) topics (CRS, Breast Cancer Screening, Eye exam)  Health Maintenance has been updated.  LINKS immunization registry triggered.  Immunizations were reconciled.      
yes

## 2021-01-19 ENCOUNTER — APPOINTMENT (OUTPATIENT)
Dept: UROLOGY | Facility: CLINIC | Age: 76
End: 2021-01-19

## 2021-03-03 ENCOUNTER — APPOINTMENT (OUTPATIENT)
Dept: ORTHOPEDIC SURGERY | Facility: CLINIC | Age: 76
End: 2021-03-03

## 2021-03-05 NOTE — ASU PATIENT PROFILE, ADULT - SPIRITUAL CULTURAL, CURRENT SITUATION, PROFILE
Tiara Smith is a 68 year old patient who comes in today for a Blood Pressure check.  Initial BP:  /88 (BP Location: Right arm, Patient Position: Chair, Cuff Size: Adult Regular)   Pulse 80   LMP  (LMP Unknown)      80  Disposition: follow-up as previously indicated by provider and provider notified while patient in the clinic. Per Dr. Arevalo patient instructed to keep taking same medication.     Lucy Tavarez MA 3/5/2021  9:36 AM           none

## 2022-05-06 NOTE — ED ADULT TRIAGE NOTE - MODE OF ARRIVAL
No new care gaps identified.  [facility] Embedded Care Gaps. Reference number: [id number]. 5/06/2022   11:48:29 AM PATRICKT   Walk in

## 2022-07-14 NOTE — ASU PREOP CHECKLIST - HEIGHT IN INCHES
Received results of  from 7/14/22  of 5. Attempted to call patient to inform of results. No answer. Left message with results and instructions to call clinic with any further questions or concerns.
9

## 2022-09-20 NOTE — H&P PST ADULT - I HAVE PERSONALLY SEEN AND EXAMINED THE PATIENT. THERE HAVE NOT BEEN ANY CHANGES IN THE PATIENT'S HISTORY OR EXAM UNLESS COMMENTED BELOW
[FreeTextEntry1] : Pt as outlined.\par Doing well on current regimen. \par BP elevated- he will monitor at home and let me know.\par Salt avoidance, contin exer and wt loss.\par Will incr trulicity dose when running out of current syringes.\par routine labs\par flu shot- will do next mo at pharm.\par Renew Colchicine in case of attack, contin Allopurinol (wasn't on list, he will check on dose, should be 200 mg)\par f/u 3 mos Statement Selected

## 2022-10-07 NOTE — ED PROVIDER NOTE - HEAD, MLM
Head is atraumatic. Head shape is symmetrical.
Patient is not an imminent risk of harming self or others.

## 2022-11-29 NOTE — ASU PATIENT PROFILE, ADULT - VISION (WITH CORRECTIVE LENSES IF THE PATIENT USUALLY WEARS THEM):
Use glasses/Partially impaired: cannot see medication labels or newsprint, but can see obstacles in path, and the surrounding layout; can count fingers at arm's length Protopic Counseling: Patient may experience a mild burning sensation during topical application. Protopic is not approved in children less than 2 years of age. There have been case reports of hematologic and skin malignancies in patients using topical calcineurin inhibitors although causality is questionable.

## 2022-12-24 NOTE — ED ADULT TRIAGE NOTE - BP NONINVASIVE SYSTOLIC (MM HG)
NEUROPSYCHOLOGICAL EVALUATION FEEDBACK     Writer met with Mr. Kenny via telephone on 12/22/2022 to provide feedback regarding his 12/20/2022 neuropsychological evaluation. For details regarding neuropsychological evaluation findings, please refer to the full neuropsychological report.    We reviewed neuropsychological results, diagnostic impressions, and recommendations as detailed in the report. The patient was provided several opportunities to ask questions and expressed understanding of results and recommendations. I informed him that the report for this evaluation will be shared with him via his [employer] patient portal and that his referring physician/medical team has access to my report through his electronic medical record. We discussed that he may contact me if any future questions or concerns arise and that my contact information is included in the signature line of this report.    Feedback Time: 47 minutes (No billing linked to current encounter; This feedback time is included in report/billing for DOS 12/202/2022)    Yakelin Linares, PhD  Clinical Neuropsychologist  [facility]  [phone]       
133
No significant past surgical history

## 2023-02-13 NOTE — ASU PREOP CHECKLIST - HAND OFF
I independently performed a history and physical on Becca Albert. All diagnostic, treatment, and disposition decisions were made by myself in conjunction with the advanced practice provider. For further details of [facility] emergency department encounter, please see LYN Watson's documentation. Patient reports he had some general malaise this morning but no acute symptoms and had an appointment with his cardiologist so he went there. At the cardiology office he was tachycardic and his cardiologist was concerned about DVT, PE or decompensated CHF and wanted him to come to the ER. Patient states he is on baseline 6 L oxygen at baseline. He states he spends most of his time sitting in a recliner. On exam he has pitting edema of both lower extremities with venous stasis dermatitis noted as well. Heart regular and tachycardic. Lungs with scattered rales. Patient with no respiratory distress and in fact is long-winded on his baseline oxygen requirement of 6 L by nasal cannula.   EKG  The Ekg interpreted by me shows  sinus tachycardia, rdrm=489  Axis is   Left axis deviation  QTc is  normal  First-degree AV block, PVCs  ST Segments: no acute change  No significant change from prior EKG dated earlier today    Labs Reviewed   CBC WITH AUTO DIFFERENTIAL - Abnormal; Notable for the following components:       Result Value    RBC 3.17 (*)     Hemoglobin 10.7 (*)     Hematocrit 32.7 (*)     .2 (*)     Neutrophils Absolute 8.7 (*)     Lymphocytes Absolute 0.4 (*)     All other components within normal limits   COMPREHENSIVE METABOLIC PANEL W/ REFLEX TO MG FOR LOW K - Abnormal; Notable for the following components:    Glucose 122 (*)     BUN 48 (*)     Est, Glom Filt Rate 57 (*)     Albumin/Globulin Ratio 1.0 (*)     ALT 9 (*)     AST 13 (*)     All other components within normal limits   D-DIMER, QUANTITATIVE - Abnormal; Notable for the following components:    D-Dimer, Quant 1.42 (*)     All other components within normal limits   TROPONIN - Abnormal; Notable for the following components:    Troponin 0.04 (*)     All other components within normal limits   COVID-19, RAPID   RAPID INFLUENZA A/B ANTIGENS   CULTURE, BLOOD 2   CULTURE, BLOOD 1   LACTIC ACID   URINALYSIS WITH REFLEX TO CULTURE   BRAIN NATRIURETIC PEPTIDE     CT CHEST PULMONARY EMBOLISM W CONTRAST   Final Result   Severe coronary artery calcification. No central pulmonary embolus   identified. 9 mm noncalcified pulmonary nodule right lower lobe, not clearly seen in   2019. Katherin Mccord Consider pulmonary follow-up, and either short-term follow-up   noncontrast chest CT or PET-CT. Decreased medial to lateral dimension of the trachea suggesting saber sheath   trachea      Nonobstructing renal calculi      RECOMMENDATIONS:   Unavailable         XR CHEST PORTABLE   Final Result   No pneumonia or edema      Bandlike opacity at the left lung base, similar to prior, Likely scarring or   atelectasis               I personally saw this patient and performed a substantive portion of the visit including all aspects of the medical decision making.     MEDICAL DECISION MAKING  History From: History from : Patient and Family daughter and cardiologist  Limitations to history : None  ED Medication Orders (From admission, onward)      Start Ordered     Status Ordering Provider    02/13/23 1500 02/13/23 1451  furosemide (LASIX) injection 40 mg  ONCE         Acknowledged BILLY GEE    02/13/23 1251 02/13/23 1251  iopamidol (ISOVUE-370) 76 % injection 75 mL  IMG ONCE PRN         Last MAR action: Given - by Jose Washington on 02/13/23 at [address]    02/13/23 1200 02/13/23 1147  0.9 % sodium chloride bolus  ONCE         Last MAR action: New Bag - by Ernesto Castellano on 02/13/23 at [address]              Chronic Conditions: CHF, sleep apnea, obesity, hypertension, diabetes    CONSULTS: (Who and What was discussed)  IP CONSULT TO CARDIOLOGY    Discussion with Other Profesionals : Consultant cardiologist, Dr. Bogdan Mireles, we discussed the case and he would like for us to give the patient dose of Lasix for his CHF exacerbation and he agrees with admission    Social Determinants : None    Records Reviewed : Outpatient Notes cardiology visit    CC/HPI Summary, DDx, ED Course, and Reassessment: Patient had tachycardia on arrival which steadily improved while in the ER on oxygen. I do believe the patient has decompensated CHF. His CT is negative for significant PE. I believe he would benefit from hospitalization for further treatment evaluation. We are also obtaining urinalysis to rule out UTI given his reported slightly elevated temperature      I am the Primary Physician of Record. I personally saw this patient and independently provided 15 minutes of non-concurrent critical care out of the total shared critical care time provided.         Theresa Schuster MD  02/13/23 2063 yes
